# Patient Record
Sex: FEMALE | Race: BLACK OR AFRICAN AMERICAN | NOT HISPANIC OR LATINO | Employment: UNEMPLOYED | URBAN - METROPOLITAN AREA
[De-identification: names, ages, dates, MRNs, and addresses within clinical notes are randomized per-mention and may not be internally consistent; named-entity substitution may affect disease eponyms.]

---

## 2019-02-13 ENCOUNTER — TELEPHONE (OUTPATIENT)
Dept: SCHEDULING | Facility: IMAGING CENTER | Age: 39
End: 2019-02-13

## 2019-04-04 ENCOUNTER — OFFICE VISIT (OUTPATIENT)
Dept: MEDICAL GROUP | Facility: MEDICAL CENTER | Age: 39
End: 2019-04-04
Payer: COMMERCIAL

## 2019-04-04 VITALS
WEIGHT: 170.19 LBS | HEART RATE: 85 BPM | DIASTOLIC BLOOD PRESSURE: 74 MMHG | TEMPERATURE: 98.6 F | OXYGEN SATURATION: 97 % | HEIGHT: 66 IN | BODY MASS INDEX: 27.35 KG/M2 | SYSTOLIC BLOOD PRESSURE: 102 MMHG

## 2019-04-04 DIAGNOSIS — Z00.00 HEALTH CARE MAINTENANCE: ICD-10-CM

## 2019-04-04 DIAGNOSIS — Z76.89 ENCOUNTER TO ESTABLISH CARE: ICD-10-CM

## 2019-04-04 DIAGNOSIS — Z00.00 ANNUAL PHYSICAL EXAM: ICD-10-CM

## 2019-04-04 DIAGNOSIS — Z97.5 IUD (INTRAUTERINE DEVICE) IN PLACE: ICD-10-CM

## 2019-04-04 DIAGNOSIS — Z72.0 TOBACCO USE: ICD-10-CM

## 2019-04-04 PROCEDURE — 99395 PREV VISIT EST AGE 18-39: CPT | Performed by: INTERNAL MEDICINE

## 2019-04-04 ASSESSMENT — PATIENT HEALTH QUESTIONNAIRE - PHQ9: CLINICAL INTERPRETATION OF PHQ2 SCORE: 0

## 2019-04-04 NOTE — PROGRESS NOTES
CHIEF COMPLIANT:  Marilin Lopez is a 38 y.o. female who presents for annual exam    Pt has GYN provider: no    Recommendations:  Regular exercise at least 4 days a week  Diet: advised balanced diet  Dental exam at least 1-2 times per year  Sunscreen use: advised    Immunizations:  TdaP: Pending records  Influenza: advised    GYN  Last PAP:  normal   Abnormal PAP: no    Menarche:      Menses every month with bleeding for 7 days  No excess cramping or bleeding.   Takes OTC analgesics for cramping  Contraception: Mirena, placed 3 yrs ago    CURRENT MEDICATIONS  No current outpatient prescriptions on file.     No current facility-administered medications for this visit.      ALLERGIES  Allergies: Patient has no known allergies.  PAST MEDICAL HISTORY  She  has a past medical history of No known problems.  SURGICAL HISTORY  She  has a past surgical history that includes primary c section.  SOCIAL HISTORY  Social History   Substance Use Topics   • Smoking status: Current Every Day Smoker     Packs/day: 0.25   • Smokeless tobacco: Never Used   • Alcohol use Yes     Social History     Social History Narrative   • No narrative on file     FAMILY HISTORY  Family History   Problem Relation Age of Onset   • Hypertension Mother    • Diabetes Mother    • Hyperlipidemia Mother    • Hypertension Brother      Family Status   Relation Status   • Mo Alive   • Fa Alive   • Bro Alive     REVIEW OF SYSTEMS  Constitutional: Denies fever, chills, sweats, fatigue.  Skin: negative for rash, scaling, itching, pigmentation, hair or nail changes.  Eyes: negative for blurred or double vision, eye pain, floaters and discharge from eyes.  ENT: negative for tinnitus, vertigo, dental problem and hoarseness, frequent URI's, sinus problems/pain.  Respiratory: negative for persistent cough, hemoptysis, dyspnea, wheezing, SOB.  Cardiovascular: negative for palpitations, tachycardia, irregular heart beats, chest pain, or peripheral edema.   Gastrointestinal:  "negative for anorexia, dysphagia, nausea, heartburn/reflux, abdominal pain, hemorrhoids, constipation or diarrhea.  Genitourinary: negative for dysuria, frequency, hesitancy, incontinence, abnormal vaginal discharge/bleeding.  Musculoskeletal: negative for back/joint pain, myalgia.   Neuro: negative for migraine headaches, involuntary movements or tremor  Psychiatric: negative for excessive alcohol use, illegal drug use, sleep problems, anxiety, depression.  Hematologic/Lymphatic/Immunologic: negative for anemia, unusual bruising, swollen glands.  Endocrine: negative for temperature intolerance, polydipsia, polyuria.     PHYSICAL EXAMINATION:  /74 (BP Location: Left arm, Patient Position: Sitting, BP Cuff Size: Adult)   Pulse 85   Temp 37 °C (98.6 °F) (Temporal)   Ht 1.676 m (5' 6\")   Wt 77.2 kg (170 lb 3.1 oz)   SpO2 97%   Body mass index is 27.47 kg/m².  Wt Readings from Last 4 Encounters:   04/04/19 77.2 kg (170 lb 3.1 oz)     General appearance:healthy, well developed, well nourished, well-groomed  Psych: alert, no distress, cooperative. Eye contact good, speech goal directed. Affect calm.   Eyes: conjunctivae and sclerae normal, lids and lashes normal, EOMI, PERRLA  ENT: Ears: external ears normal to inspection, canals clear. TMs pearly gray with normal light reflex and anatomic landmarks, Nose/Sinuses: nose shows no deformity, asymmetry, or inflammation, nasal mucosa normal, no sinus tenderness,   Oropharynx: lips normal without lesions, buccal mucosa normal, gums healthy, teeth intact, non-carious, palate normal, tongue midline and normal  Neck: no asymmetry, masses, adenopathy. Thyroid normal to palpation, carotids without bruits, no jugular venous distention  Lungs: clear to auscultation with good excursion, Normal respiratory rate. Chest symmetric no chest wall tenderness.  Cardiovascular: Regular rate and rhythm, no murmur.  No peripheral edema  Abdomen:  Soft, non-tender, no masses, HSM or " palpable renal abnormalities. Bowel sounds normal, no bruits heard. No CVAT.  Musculoskeletal: no evidence of joint effusion, ROM of all joints is normal, no crepitation detected, strength grossly normal UE and LE, proximal and distal motor groups. No deformities present  Lymphatic: None significantly enlarged supraclavicular, axillary, or inguinal  Skin: color normal, vascularity normal, no rashes or suspicious lesions, no evidence of bleeding or bruising  Neuro: speech normal, mental status intact, gait grossly normal, muscle tone normal.    LABS    None    ASSESSMENT/PLAN:    1. Annual physical exam  Reviewed PMH, PSH, FH, SH, ALL, MEDS, HCM/IMM.   Advised healthy habits, diet, exercise.    2. Health care maintenance  3. Encounter to establish care  Reviewed PMH, PSH, FH, SH, ALL, MEDS, HCM/IMM.   Release form for old records: signed    4. IUD (intrauterine device) in place x 3 yrs  No complications.    5.  Tobacco use  Advised to quit smoking    Smoking Counseling: As above    Next office visit is due in 3 months PAP    All questions are answered.     Please note that this dictation was created using voice recognition software. Despite all efforts, some minor grammar mistakes are possible.

## 2019-06-04 ENCOUNTER — OFFICE VISIT (OUTPATIENT)
Dept: MEDICAL GROUP | Facility: MEDICAL CENTER | Age: 39
End: 2019-06-04
Payer: COMMERCIAL

## 2019-06-04 ENCOUNTER — HOSPITAL ENCOUNTER (OUTPATIENT)
Facility: MEDICAL CENTER | Age: 39
End: 2019-06-04
Attending: INTERNAL MEDICINE
Payer: COMMERCIAL

## 2019-06-04 VITALS
HEART RATE: 78 BPM | HEIGHT: 66 IN | DIASTOLIC BLOOD PRESSURE: 74 MMHG | WEIGHT: 169.09 LBS | SYSTOLIC BLOOD PRESSURE: 116 MMHG | BODY MASS INDEX: 27.18 KG/M2

## 2019-06-04 DIAGNOSIS — R06.83 SNORING: ICD-10-CM

## 2019-06-04 DIAGNOSIS — Z00.00 HEALTH CARE MAINTENANCE: ICD-10-CM

## 2019-06-04 DIAGNOSIS — Z12.4 SCREENING FOR MALIGNANT NEOPLASM OF CERVIX: ICD-10-CM

## 2019-06-04 DIAGNOSIS — Z01.419 WELL FEMALE EXAM WITH ROUTINE GYNECOLOGICAL EXAM: ICD-10-CM

## 2019-06-04 DIAGNOSIS — Z91.09 ENVIRONMENTAL ALLERGIES: ICD-10-CM

## 2019-06-04 DIAGNOSIS — Z72.0 TOBACCO USE: ICD-10-CM

## 2019-06-04 DIAGNOSIS — Z97.5 IUD (INTRAUTERINE DEVICE) IN PLACE: ICD-10-CM

## 2019-06-04 PROCEDURE — 99214 OFFICE O/P EST MOD 30 MIN: CPT | Mod: 25 | Performed by: INTERNAL MEDICINE

## 2019-06-04 PROCEDURE — 99395 PREV VISIT EST AGE 18-39: CPT | Performed by: INTERNAL MEDICINE

## 2019-06-04 PROCEDURE — 87624 HPV HI-RISK TYP POOLED RSLT: CPT

## 2019-06-04 PROCEDURE — 88175 CYTOPATH C/V AUTO FLUID REDO: CPT

## 2019-06-04 PROCEDURE — 99000 SPECIMEN HANDLING OFFICE-LAB: CPT | Performed by: INTERNAL MEDICINE

## 2019-06-04 NOTE — PROGRESS NOTES
SUBJECTIVE: 38 y.o. female for annual routine gynecologic exam  Chief Complaint   Patient presents with   • Shoulder Pain     (L) shoulder pain x 2 weeks   • Knee Pain     (L) knee Pain   • Abdominal Pain     Abdominal Pain/Diarrhea after eating   • Gynecologic Exam     Allergies  New problem to discuss.  The patient has history of allergies, used OTC antihistamine.  She requested allergy referral.    Snoring  New problem.  The patient has difficulty to go to study.  Complains of snoring, daytime sleepiness and fatigue.  She requested referral.    Obstetric History     No data available      Last Pap: remote  H/O Abnormal Pap no  Contraception: IUD placed 2 to 3 years ago, not sure.    History   Sexual Activity   • Sexual activity: Not on file     She  reports that she has been smoking.  She has been smoking about 0.25 packs per day. She has never used smokeless tobacco.     Periods: 7 days, heavy    Allergies: Patient has no known allergies.     ROS:    Menses every month for 7 days heavy bleeding.  Cramping is mild.   She does take OTC analgesics for cramping  No significant bloating/fluid retention, pelvic pain, or dyspareunia. No vaginal discharge   No breast tenderness, mass, nipple discharge, changes in size or contour, or abnormal cyclic discomfort.  No urinary tract symptoms, no incontinence.   No abdominal pain, change in bowel habits, black or bloody stools.    No unusual headaches, no visual changes, menstrual migraines   No prolonged cough. No dyspnea or chest pain on exertion.  No depression, labile mood, anxiety ,libido changes, insomnia.  No new/concerning skin lesions, concerns.    Preventive Care:  Health Maintenance Topics with due status: Overdue       Topic Date Due    IMM DTaP/Tdap/Td Vaccine 12/21/1998    IMM PNEUMOCOCCAL 19-64 (ADULT) MEDIUM RISK SERIES 12/21/1998     Current medicines (including changes today)  No current outpatient prescriptions on file.     No current facility-administered  medications for this visit.      She  has a past medical history of Lactose intolerance.  She  has a past surgical history that includes primary c section.     Family History:   Family History   Problem Relation Age of Onset   • Hypertension Mother    • Diabetes Mother    • Hyperlipidemia Mother    • Hypertension Brother        Family History negative for : Breast, Colon, Lung, or female organ cancer, thyroid disease, CAD, Diabetes, osteoporosis.     OBJECTIVE:   /74 (BP Location: Left arm, Patient Position: Sitting, BP Cuff Size: Adult)   Wt 76.7 kg (169 lb 1.5 oz)   BMI 27.29 kg/m²   Body mass index is 27.29 kg/m².    HEAD AND NECK:  Ears normal.  Throat, oral cavity and tongue normal.  Neck supple. No adenopathy or masses in the neck or supraclavicular regions.  No carotid bruits. No thyromegaly.   NEURO: Cranial nerves are normal. DTR's normal and symmetric.  CHEST:  Clear, good air entry, no wheezes or rales.   HEART:  Regular rate and rhythm.  S1 and S2 normal.  No edema or JVD. ABDOMEN:  Soft without tenderness, guarding, mass or organomegaly.  No CVA tenderness or inguinal adenopathy.   EXTREMITIES:  Extremities, reflexes and peripheral pulses are normal. SKIN: color normal, vascularity normal, no edema, temperature normal   No rashes or suspicious skin lesions noted.     Pelvic Exam -  Normal external genitalia with no lesions. Normal vaginal mucosa with normal rugation and no discharge. Cervix with no visible lesions. No cervical motion tenderness. Uterus is normal sized with no masses. No adnexal tenderness or enlargement appreciated.   Thin Prep PAP is obtained.    <ASSESSMENT and PLAN>    1. Well female exam with routine gynecological exam  - THINPREP PAP WITH HPV; Future    2. Screening for malignant neoplasm of cervix  - THINPREP PAP WITH HPV; Future    3. Health care maintenance  - THINPREP PAP WITH HPV; Future    4. IUD (intrauterine device) in place  She has Mirena, improved  duration/severity of menstrual periods    5. Environmental allergies  Requested referral  - REFERRAL TO ALLERGY    6. Snoring  Requested referral  - REFERRAL TO SLEEP STUDIES  - REFERRAL TO PULMONOLOGY    7. Tobacco use  Advised to quit smoking    Follow-up in 1 year and prn

## 2019-06-05 DIAGNOSIS — Z01.419 WELL FEMALE EXAM WITH ROUTINE GYNECOLOGICAL EXAM: ICD-10-CM

## 2019-06-05 DIAGNOSIS — Z00.00 HEALTH CARE MAINTENANCE: ICD-10-CM

## 2019-06-05 DIAGNOSIS — Z12.4 SCREENING FOR MALIGNANT NEOPLASM OF CERVIX: ICD-10-CM

## 2019-06-06 LAB
CYTOLOGY REG CYTOL: NORMAL
HPV HR 12 DNA CVX QL NAA+PROBE: NEGATIVE
HPV16 DNA SPEC QL NAA+PROBE: NEGATIVE
HPV18 DNA SPEC QL NAA+PROBE: NEGATIVE
SPECIMEN SOURCE: NORMAL

## 2019-07-30 DIAGNOSIS — Z12.83 SKIN CANCER SCREENING: ICD-10-CM

## 2019-09-03 ENCOUNTER — OFFICE VISIT (OUTPATIENT)
Dept: URGENT CARE | Facility: CLINIC | Age: 39
End: 2019-09-03
Payer: COMMERCIAL

## 2019-09-03 VITALS
WEIGHT: 171 LBS | RESPIRATION RATE: 16 BRPM | OXYGEN SATURATION: 98 % | TEMPERATURE: 98.1 F | HEART RATE: 78 BPM | DIASTOLIC BLOOD PRESSURE: 70 MMHG | BODY MASS INDEX: 27.6 KG/M2 | SYSTOLIC BLOOD PRESSURE: 102 MMHG

## 2019-09-03 DIAGNOSIS — R10.30 LOWER ABDOMINAL PAIN: ICD-10-CM

## 2019-09-03 DIAGNOSIS — R11.0 NAUSEA: ICD-10-CM

## 2019-09-03 LAB
APPEARANCE UR: CLEAR
BILIRUB UR STRIP-MCNC: NORMAL MG/DL
COLOR UR AUTO: YELLOW
GLUCOSE UR STRIP.AUTO-MCNC: NORMAL MG/DL
INT CON NEG: NORMAL
INT CON POS: NORMAL
KETONES UR STRIP.AUTO-MCNC: NORMAL MG/DL
LEUKOCYTE ESTERASE UR QL STRIP.AUTO: NORMAL
NITRITE UR QL STRIP.AUTO: NORMAL
PH UR STRIP.AUTO: 6 [PH] (ref 5–8)
POC URINE PREGNANCY TEST: NEGATIVE
PROT UR QL STRIP: NORMAL MG/DL
RBC UR QL AUTO: NORMAL
SP GR UR STRIP.AUTO: 1.02
UROBILINOGEN UR STRIP-MCNC: 0.2 MG/DL

## 2019-09-03 PROCEDURE — 81002 URINALYSIS NONAUTO W/O SCOPE: CPT | Performed by: PHYSICIAN ASSISTANT

## 2019-09-03 PROCEDURE — 99214 OFFICE O/P EST MOD 30 MIN: CPT | Performed by: PHYSICIAN ASSISTANT

## 2019-09-03 PROCEDURE — 81025 URINE PREGNANCY TEST: CPT | Performed by: PHYSICIAN ASSISTANT

## 2019-09-03 RX ORDER — ONDANSETRON 4 MG/1
4 TABLET, FILM COATED ORAL EVERY 4 HOURS PRN
Qty: 20 TAB | Refills: 0 | Status: SHIPPED | OUTPATIENT
Start: 2019-09-03 | End: 2020-05-12

## 2019-09-03 RX ORDER — ONDANSETRON 2 MG/ML
4 INJECTION INTRAMUSCULAR; INTRAVENOUS ONCE
Status: COMPLETED | OUTPATIENT
Start: 2019-09-03 | End: 2019-09-03

## 2019-09-03 RX ADMIN — ONDANSETRON 4 MG: 2 INJECTION INTRAMUSCULAR; INTRAVENOUS at 17:12

## 2019-09-03 ASSESSMENT — ENCOUNTER SYMPTOMS
FLANK PAIN: 0
FEVER: 0
CONSTIPATION: 0
CHANGE IN BOWEL HABIT: 0
CHILLS: 0
BLOOD IN STOOL: 0
ANOREXIA: 1
DIZZINESS: 0
DIARRHEA: 0
COUGH: 0
PALPITATIONS: 0
SORE THROAT: 0
EYE PAIN: 0
ABDOMINAL PAIN: 1
BLURRED VISION: 0
VOMITING: 0
MYALGIAS: 0
HEADACHES: 1
NAUSEA: 1

## 2019-09-03 NOTE — PROGRESS NOTES
Subjective:      Marilin Lopez is a 38 y.o. female who presents with Nausea (cough, weakness x1 day )      Nausea   This is a new problem. The current episode started yesterday (Started last night). The problem occurs constantly. The problem has been unchanged. Associated symptoms include abdominal pain, anorexia, headaches and nausea. Pertinent negatives include no change in bowel habit, chest pain, chills, congestion, coughing, fever, myalgias, rash, sore throat, urinary symptoms or vomiting. Nothing aggravates the symptoms. She has tried nothing for the symptoms.       Review of Systems   Constitutional: Positive for malaise/fatigue. Negative for chills and fever.   HENT: Negative for congestion and sore throat.    Eyes: Negative for blurred vision and pain.   Respiratory: Negative for cough.    Cardiovascular: Negative for chest pain and palpitations.   Gastrointestinal: Positive for abdominal pain, anorexia and nausea. Negative for blood in stool, change in bowel habit, constipation, diarrhea, melena and vomiting.   Genitourinary: Negative for dysuria, flank pain, frequency, hematuria and urgency.   Musculoskeletal: Negative for myalgias.   Skin: Negative for rash.   Neurological: Positive for headaches. Negative for dizziness.       PMH:  has a past medical history of Lactose intolerance.  MEDS:   Current Outpatient Medications:   •  ondansetron (ZOFRAN) 4 MG Tab tablet, Take 1 Tab by mouth every four hours as needed for Nausea/Vomiting., Disp: 20 Tab, Rfl: 0    Current Facility-Administered Medications:   •  ondansetron (ZOFRAN) syringe/vial injection 4 mg, 4 mg, Intramuscular, Once, Renetta Guzman, P.A.-C.  ALLERGIES: No Known Allergies  SURGHX:   Past Surgical History:   Procedure Laterality Date   • PRIMARY C SECTION       SOCHX:  reports that she has been smoking. She has been smoking about 0.25 packs per day. She has never used smokeless tobacco. She reports that she drinks alcohol. She reports that she  does not use drugs.  FH: Family history was reviewed, no pertinent findings to report     Objective:     /70 (BP Location: Left arm, Patient Position: Sitting, BP Cuff Size: Adult)   Pulse 78   Temp 36.7 °C (98.1 °F) (Temporal)   Resp 16   Wt 77.6 kg (171 lb)   LMP 08/21/2019   SpO2 98%   BMI 27.60 kg/m²      Physical Exam   Constitutional: She is oriented to person, place, and time. She appears well-developed and well-nourished.   HENT:   Head: Normocephalic and atraumatic.   Right Ear: External ear normal.   Left Ear: External ear normal.   Eyes: Pupils are equal, round, and reactive to light. Conjunctivae are normal.   Neck: Normal range of motion.   Cardiovascular: Normal rate, regular rhythm and normal heart sounds.   No murmur heard.  Pulmonary/Chest: Effort normal and breath sounds normal. She has no wheezes.   Lymphadenopathy:     She has no cervical adenopathy.   Neurological: She is alert and oriented to person, place, and time.   Skin: Skin is warm and dry. Capillary refill takes less than 2 seconds.   Psychiatric: She has a normal mood and affect. Her behavior is normal. Judgment normal.       POCT Urinalysis  Lab Results   Component Value Date/Time    POCCOLOR YELLOW 09/03/2019 02:05 PM    POCAPPEAR CLEAR 09/03/2019 02:05 PM    POCLEUKEST neg 09/03/2019 02:05 PM    POCNITRITE neg 09/03/2019 02:05 PM    POCUROBILIGE 0.2 09/03/2019 02:05 PM    POCPROTEIN neg 09/03/2019 02:05 PM    POCURPH 6.0 09/03/2019 02:05 PM    POCBLOOD neg 09/03/2019 02:05 PM    POCSPGRV 1.025 09/03/2019 02:05 PM    POCKETONES TR 09/03/2019 02:05 PM    POCBILIRUBIN neg 09/03/2019 02:05 PM    POCGLUCUA neg 09/03/2019 02:05 PM          POCT Pregnancy - Negative     Assessment/Plan:     1. Nausea  - POCT Urinalysis  - POCT Pregnancy  - ondansetron (ZOFRAN) syringe/vial injection 4 mg  - ondansetron (ZOFRAN) 4 MG Tab tablet; Take 1 Tab by mouth every four hours as needed for Nausea/Vomiting.  Dispense: 20 Tab; Refill:  0  *Physical exam unremarkable except for some mild abd TTP. No evidence of UTI. Likely a viral illness or some gastroenteritis. Advised her to increase her fluid intake.    2. Lower abdominal pain  - POCT Urinalysis  - POCT Pregnancy      Differential Diagnosis, natural history, and supportive care discussed. Return to the Urgent Care or follow up with your PCP if symptoms fail to resolve, or for any new or worsening symptoms. Emergency room precautions discussed. Patient and/or family appears understanding of information.

## 2019-09-23 ENCOUNTER — APPOINTMENT (RX ONLY)
Dept: URBAN - METROPOLITAN AREA CLINIC 31 | Facility: CLINIC | Age: 39
Setting detail: DERMATOLOGY
End: 2019-09-23

## 2019-09-23 DIAGNOSIS — L70.0 ACNE VULGARIS: ICD-10-CM

## 2019-09-23 DIAGNOSIS — L20.89 OTHER ATOPIC DERMATITIS: ICD-10-CM

## 2019-09-23 PROCEDURE — 99203 OFFICE O/P NEW LOW 30 MIN: CPT

## 2019-09-23 PROCEDURE — ? COUNSELING

## 2019-09-23 PROCEDURE — ? PRESCRIPTION

## 2019-09-23 PROCEDURE — ? COUNSELING: TOPICAL STEROIDS

## 2019-09-23 RX ORDER — CLINDAMYCIN PHOSPHATE 10 MG/G
GEL TOPICAL
Qty: 60 | Refills: 11 | Status: ERX | COMMUNITY
Start: 2019-09-23

## 2019-09-23 RX ORDER — TRIAMCINOLONE ACETONIDE 1 MG/G
CREAM TOPICAL BID
Qty: 1 | Refills: 2 | Status: ERX | COMMUNITY
Start: 2019-09-23

## 2019-09-23 RX ORDER — DOXYCYCLINE HYCLATE 100 MG/1
CAPSULE, GELATIN COATED ORAL BID
Qty: 60 | Refills: 3 | Status: ERX | COMMUNITY
Start: 2019-09-23

## 2019-09-23 RX ADMIN — DOXYCYCLINE HYCLATE: 100 CAPSULE, GELATIN COATED ORAL at 17:22

## 2019-09-23 RX ADMIN — TRIAMCINOLONE ACETONIDE: 1 CREAM TOPICAL at 17:26

## 2019-09-23 RX ADMIN — CLINDAMYCIN PHOSPHATE: 10 GEL TOPICAL at 17:22

## 2019-09-23 ASSESSMENT — LOCATION SIMPLE DESCRIPTION DERM
LOCATION SIMPLE: LEFT CHEEK
LOCATION SIMPLE: RIGHT UPPER BACK
LOCATION SIMPLE: LEFT FOREARM
LOCATION SIMPLE: LEFT PRETIBIAL REGION
LOCATION SIMPLE: RIGHT POSTERIOR UPPER ARM
LOCATION SIMPLE: RIGHT CHEEK
LOCATION SIMPLE: RIGHT FOREARM
LOCATION SIMPLE: LEFT POSTERIOR UPPER ARM
LOCATION SIMPLE: RIGHT PRETIBIAL REGION

## 2019-09-23 ASSESSMENT — LOCATION DETAILED DESCRIPTION DERM
LOCATION DETAILED: RIGHT PROXIMAL PRETIBIAL REGION
LOCATION DETAILED: LEFT PROXIMAL PRETIBIAL REGION
LOCATION DETAILED: RIGHT SUPERIOR UPPER BACK
LOCATION DETAILED: RIGHT INFERIOR CENTRAL MALAR CHEEK
LOCATION DETAILED: LEFT CENTRAL MANDIBULAR CHEEK
LOCATION DETAILED: RIGHT PROXIMAL DORSAL FOREARM
LOCATION DETAILED: RIGHT LATERAL BUCCAL CHEEK
LOCATION DETAILED: LEFT DISTAL POSTERIOR UPPER ARM
LOCATION DETAILED: LEFT INFERIOR CENTRAL MALAR CHEEK
LOCATION DETAILED: LEFT PROXIMAL DORSAL FOREARM
LOCATION DETAILED: RIGHT PROXIMAL POSTERIOR UPPER ARM

## 2019-09-23 ASSESSMENT — LOCATION ZONE DERM
LOCATION ZONE: ARM
LOCATION ZONE: FACE
LOCATION ZONE: TRUNK
LOCATION ZONE: LEG

## 2019-09-23 NOTE — PROCEDURE: COUNSELING
Detail Level: Zone
Patient Specific Counseling (Will Not Stick From Patient To Patient): Recommended Deb Cream
Tazorac Counseling:  Patient advised that medication is irritating and drying.  Patient may need to apply sparingly and wash off after an hour before eventually leaving it on overnight.  The patient verbalized understanding of the proper use and possible adverse effects of tazorac.  All of the patient's questions and concerns were addressed.
Isotretinoin Counseling: Patient should get monthly blood tests, not donate blood, not drive at night if vision affected, not share medication, and not undergo elective surgery for 6 months after tx completed. Side effects reviewed, pt to contact office should one occur.
Topical Sulfur Applications Pregnancy And Lactation Text: This medication is Pregnancy Category C and has an unknown safety profile during pregnancy. It is unknown if this topical medication is excreted in breast milk.
High Dose Vitamin A Counseling: Side effects reviewed, pt to contact office should one occur.
Topical Clindamycin Counseling: Patient counseled that this medication may cause skin irritation or allergic reactions.  In the event of skin irritation, the patient was advised to reduce the amount of the drug applied or use it less frequently.   The patient verbalized understanding of the proper use and possible adverse effects of clindamycin.  All of the patient's questions and concerns were addressed.
Spironolactone Pregnancy And Lactation Text: This medication can cause feminization of the male fetus and should be avoided during pregnancy. The active metabolite is also found in breast milk.
Dapsone Pregnancy And Lactation Text: This medication is Pregnancy Category C and is not considered safe during pregnancy or breast feeding.
Tetracycline Pregnancy And Lactation Text: This medication is Pregnancy Category D and not consider safe during pregnancy. It is also excreted in breast milk.
Doxycycline Pregnancy And Lactation Text: This medication is Pregnancy Category D and not consider safe during pregnancy. It is also excreted in breast milk but is considered safe for shorter treatment courses.
Erythromycin Pregnancy And Lactation Text: This medication is Pregnancy Category B and is considered safe during pregnancy. It is also excreted in breast milk.
Topical Retinoid Pregnancy And Lactation Text: This medication is Pregnancy Category C. It is unknown if this medication is excreted in breast milk.
Minocycline Counseling: Patient advised regarding possible photosensitivity and discoloration of the teeth, skin, lips, tongue and gums.  Patient instructed to avoid sunlight, if possible.  When exposed to sunlight, patients should wear protective clothing, sunglasses, and sunscreen.  The patient was instructed to call the office immediately if the following severe adverse effects occur:  hearing changes, easy bruising/bleeding, severe headache, or vision changes.  The patient verbalized understanding of the proper use and possible adverse effects of minocycline.  All of the patient's questions and concerns were addressed.
Birth Control Pills Counseling: Birth Control Pill Counseling: I discussed with the patient the potential side effects of OCPs including but not limited to increased risk of stroke, heart attack, thrombophlebitis, deep venous thrombosis, hepatic adenomas, breast changes, GI upset, headaches, and depression.  The patient verbalized understanding of the proper use and possible adverse effects of OCPs. All of the patient's questions and concerns were addressed.
Azithromycin Pregnancy And Lactation Text: This medication is considered safe during pregnancy and is also secreted in breast milk.
Topical Sulfur Applications Counseling: Topical Sulfur Counseling: Patient counseled that this medication may cause skin irritation or allergic reactions.  In the event of skin irritation, the patient was advised to reduce the amount of the drug applied or use it less frequently.   The patient verbalized understanding of the proper use and possible adverse effects of topical sulfur application.  All of the patient's questions and concerns were addressed.
Spironolactone Counseling: Patient advised regarding risks of diarrhea, abdominal pain, hyperkalemia, birth defects (for female patients), liver toxicity and renal toxicity. The patient may need blood work to monitor liver and kidney function and potassium levels while on therapy. The patient verbalized understanding of the proper use and possible adverse effects of spironolactone.  All of the patient's questions and concerns were addressed.
Dapsone Counseling: I discussed with the patient the risks of dapsone including but not limited to hemolytic anemia, agranulocytosis, rashes, methemoglobinemia, kidney failure, peripheral neuropathy, headaches, GI upset, and liver toxicity.  Patients who start dapsone require monitoring including baseline LFTs and weekly CBCs for the first month, then every month thereafter.  The patient verbalized understanding of the proper use and possible adverse effects of dapsone.  All of the patient's questions and concerns were addressed.
Benzoyl Peroxide Pregnancy And Lactation Text: This medication is Pregnancy Category C. It is unknown if benzoyl peroxide is excreted in breast milk.
Doxycycline Counseling:  Patient counseled regarding possible photosensitivity and increased risk for sunburn.  Patient instructed to avoid sunlight, if possible.  When exposed to sunlight, patients should wear protective clothing, sunglasses, and sunscreen.  The patient was instructed to call the office immediately if the following severe adverse effects occur:  hearing changes, easy bruising/bleeding, severe headache, or vision changes.  The patient verbalized understanding of the proper use and possible adverse effects of doxycycline.  All of the patient's questions and concerns were addressed.
Isotretinoin Pregnancy And Lactation Text: This medication is Pregnancy Category X and is considered extremely dangerous during pregnancy. It is unknown if it is excreted in breast milk.
Tazorac Pregnancy And Lactation Text: This medication is not safe during pregnancy. It is unknown if this medication is excreted in breast milk.
Topical Clindamycin Pregnancy And Lactation Text: This medication is Pregnancy Category B and is considered safe during pregnancy. It is unknown if it is excreted in breast milk.
Azithromycin Counseling:  I discussed with the patient the risks of azithromycin including but not limited to GI upset, allergic reaction, drug rash, diarrhea, and yeast infections.
High Dose Vitamin A Pregnancy And Lactation Text: High dose vitamin A therapy is contraindicated during pregnancy and breast feeding.
Bactrim Pregnancy And Lactation Text: This medication is Pregnancy Category D and is known to cause fetal risk.  It is also excreted in breast milk.
Tetracycline Counseling: Patient counseled regarding possible photosensitivity and increased risk for sunburn.  Patient instructed to avoid sunlight, if possible.  When exposed to sunlight, patients should wear protective clothing, sunglasses, and sunscreen.  The patient was instructed to call the office immediately if the following severe adverse effects occur:  hearing changes, easy bruising/bleeding, severe headache, or vision changes.  The patient verbalized understanding of the proper use and possible adverse effects of tetracycline.  All of the patient's questions and concerns were addressed. Patient understands to avoid pregnancy while on therapy due to potential birth defects.
Bactrim Counseling:  I discussed with the patient the risks of sulfa antibiotics including but not limited to GI upset, allergic reaction, drug rash, diarrhea, dizziness, photosensitivity, and yeast infections.  Rarely, more serious reactions can occur including but not limited to aplastic anemia, agranulocytosis, methemoglobinemia, blood dyscrasias, liver or kidney failure, lung infiltrates or desquamative/blistering drug rashes.
Birth Control Pills Pregnancy And Lactation Text: This medication should be avoided if pregnant and for the first 30 days post-partum.
Topical Retinoid counseling:  Patient advised to apply a pea-sized amount only at bedtime and wait 30 minutes after washing their face before applying.  If too drying, patient may add a non-comedogenic moisturizer. The patient verbalized understanding of the proper use and possible adverse effects of retinoids.  All of the patient's questions and concerns were addressed.
Benzoyl Peroxide Counseling: Patient counseled that medicine may cause skin irritation and bleach clothing.  In the event of skin irritation, the patient was advised to reduce the amount of the drug applied or use it less frequently.   The patient verbalized understanding of the proper use and possible adverse effects of benzoyl peroxide.  All of the patient's questions and concerns were addressed.
Erythromycin Counseling:  I discussed with the patient the risks of erythromycin including but not limited to GI upset, allergic reaction, drug rash, diarrhea, increase in liver enzymes, and yeast infections.

## 2019-10-03 ENCOUNTER — SLEEP CENTER VISIT (OUTPATIENT)
Dept: SLEEP MEDICINE | Facility: MEDICAL CENTER | Age: 39
End: 2019-10-03
Payer: COMMERCIAL

## 2019-10-03 VITALS
RESPIRATION RATE: 14 BRPM | HEART RATE: 89 BPM | OXYGEN SATURATION: 97 % | SYSTOLIC BLOOD PRESSURE: 112 MMHG | HEIGHT: 66 IN | WEIGHT: 177 LBS | DIASTOLIC BLOOD PRESSURE: 60 MMHG | BODY MASS INDEX: 28.45 KG/M2

## 2019-10-03 DIAGNOSIS — Z72.0 TOBACCO USE: ICD-10-CM

## 2019-10-03 DIAGNOSIS — G47.33 OSA (OBSTRUCTIVE SLEEP APNEA): ICD-10-CM

## 2019-10-03 DIAGNOSIS — E66.3 OVERWEIGHT: ICD-10-CM

## 2019-10-03 DIAGNOSIS — Z23 NEED FOR INFLUENZA VACCINATION: ICD-10-CM

## 2019-10-03 PROCEDURE — 99244 OFF/OP CNSLTJ NEW/EST MOD 40: CPT | Performed by: INTERNAL MEDICINE

## 2019-10-03 RX ORDER — DOXYCYCLINE HYCLATE 100 MG/1
CAPSULE ORAL
Refills: 3 | COMMUNITY
Start: 2019-09-23 | End: 2019-10-02

## 2019-10-03 RX ORDER — TRIAMCINOLONE ACETONIDE 1 MG/G
CREAM TOPICAL
Refills: 2 | COMMUNITY
Start: 2019-09-23 | End: 2019-10-01

## 2019-10-03 RX ORDER — CETIRIZINE HYDROCHLORIDE 10 MG/1
10 TABLET ORAL DAILY
COMMUNITY

## 2019-10-03 RX ORDER — ALBUTEROL SULFATE 90 UG/1
2 AEROSOL, METERED RESPIRATORY (INHALATION)
Refills: 5 | COMMUNITY
Start: 2019-08-15

## 2019-10-03 RX ORDER — ZOLPIDEM TARTRATE 5 MG/1
5 TABLET ORAL NIGHTLY PRN
Qty: 3 TAB | Refills: 0 | Status: SHIPPED | OUTPATIENT
Start: 2019-10-03 | End: 2019-11-03

## 2019-10-03 RX ORDER — CLINDAMYCIN PHOSPHATE 10 MG/G
GEL TOPICAL
Refills: 11 | COMMUNITY
Start: 2019-09-23 | End: 2019-10-01

## 2019-10-03 NOTE — PROGRESS NOTES
"CC: \"Stops breathing while sleeping\"    HPI:  Ms. Marilin Lopez is a 38-year-old female kindly referred by Dr. Epps for evaluation of possible obstructive sleep apnea syndrome.  Dr. Epps was able to elicit a history of snoring, daytime sleepiness, and fatigue which prompted the referral.    Patient briefly describes her sleep problem as \"loud snoring and sometimes coughing.  Stops breathing.  3 to 4 years duration\".  She considers the severity to be about a 3 out of 10.    She goes to bed between 1-3 AM and gets up between 6- 8 AM.  Her sleep latency is partially 1 hour.  She may shower, watch TV, or listen to his \"sleeping music\" just prior to turn out the lights and attempting to go to sleep.  On the average she wakes up 34 times each night.  She may experience one episode of nocturia.    Symptoms include waking up too early in the morning and being unable to return to sleep, sleepiness during the day, too little sleep at night, and tiredness during the day.  She estimates she only sleeps about 5 to 6 hours.    She does experience nocturnal shortness of breath and is known to be a loud enough snorer to disturb her bed partner.    She reports restless legs and leg or arm jerking or twitching during sleep.  She reports sleep talking, waking up screaming or seemingly afraid, disturbing dreams, and wakes up with headaches.  Her grandmother and son both have sleep onset insomnia.  She may fall asleep accidentally when watching TV.        Significant comorbidities and modifying factors include tobacco abuse, allergies, overweight, need for influenza vaccination, and snoring.            Patient Active Problem List    Diagnosis Date Noted   • CECILIO (obstructive sleep apnea) 10/03/2019   • Overweight 10/03/2019   • Need for influenza vaccination 10/03/2019   • IUD (intrauterine device) in place 06/04/2019   • Health care maintenance 04/04/2019   • Annual physical exam 04/04/2019   • Tobacco use 04/04/2019       Past Medical " History:   Diagnosis Date   • Chickenpox    • Influenza    • Lactose intolerance         Past Surgical History:   Procedure Laterality Date   • PRIMARY C SECTION         Family History   Problem Relation Age of Onset   • Hypertension Mother    • Diabetes Mother    • Hyperlipidemia Mother    • Hypertension Brother        Social History     Socioeconomic History   • Marital status: Single     Spouse name: Not on file   • Number of children: Not on file   • Years of education: Not on file   • Highest education level: Not on file   Occupational History   • Not on file   Social Needs   • Financial resource strain: Not on file   • Food insecurity:     Worry: Not on file     Inability: Not on file   • Transportation needs:     Medical: Not on file     Non-medical: Not on file   Tobacco Use   • Smoking status: Current Every Day Smoker     Packs/day: 0.25     Types: Cigars   • Smokeless tobacco: Never Used   • Tobacco comment: 6/day   Substance and Sexual Activity   • Alcohol use: Yes     Alcohol/week: 0.6 oz     Types: 1 Glasses of wine per week   • Drug use: No   • Sexual activity: Not on file   Lifestyle   • Physical activity:     Days per week: Not on file     Minutes per session: Not on file   • Stress: Not on file   Relationships   • Social connections:     Talks on phone: Not on file     Gets together: Not on file     Attends Mormonism service: Not on file     Active member of club or organization: Not on file     Attends meetings of clubs or organizations: Not on file     Relationship status: Not on file   • Intimate partner violence:     Fear of current or ex partner: Not on file     Emotionally abused: Not on file     Physically abused: Not on file     Forced sexual activity: Not on file   Other Topics Concern   • Not on file   Social History Narrative   • Not on file       Current Outpatient Medications   Medication Sig Dispense Refill   • albuterol 108 (90 Base) MCG/ACT Aero Soln inhalation aerosol Inhale 2 Puffs  "by mouth.  5   • cetirizine (ZYRTEC) 10 MG Tab Take 10 mg by mouth every day.     • zolpidem (AMBIEN) 5 MG Tab Take 1 Tab by mouth at bedtime as needed for up to 3 doses. Take 1-3 tabs prn 3 Tab 0   • ondansetron (ZOFRAN) 4 MG Tab tablet Take 1 Tab by mouth every four hours as needed for Nausea/Vomiting. 20 Tab 0     No current facility-administered medications for this visit.     \"CURRENT RX\"    ALLERGIES: Patient has no known allergies.    ROS    Constitutional: Denies fever, chills, sweats,  weight loss, fatigue.  Eyes: Denies vision loss, pain, drainage, double vision, wears glasses.  Ears/Nose/Mouth/Throat: Denies earache, difficulty hearing, rhinitis/nasal congestion, injury, recurrent sore throat, persistent hoarseness, decayed teeth/toothaches, positive for ringing or buzzing in the ears.  Cardiovascular: Denies chest pain, tightness, palpitations, swelling in legs/feet, fainting, difficulty breathing when lying down but gets better when sitting up.   Respiratory: Positive for shortness of breath and cough  Sleep: per HPI  Gastrointestinal: Denies  difficulty swallowing, positive for nausea, denies the following abdominal pain, diarrhea, constipation, heartburn.  Genitourinary: Denies  blood in urine, discharge, frequent urination.  Positive for irregular periods and bleeding between periods  Musculoskeletal: Denies painful joints, sore muscles, back pain.   Integumentary: Denies rashes, lumps, positive for color changes.   Neurological: Positive for frequent headaches and dizziness    PHYSICAL EXAM  Overweight but not obese    /60 (BP Location: Left arm, Patient Position: Sitting, BP Cuff Size: Adult)   Pulse 89   Resp 14   Ht 1.676 m (5' 6\")   Wt 80.3 kg (177 lb)   SpO2 97%   BMI 28.57 kg/m²   Appearance: Well-nourished, well-developed, no acute distress  Eyes:  PERRLA, EOMI  ENMT: without lesions, deformities;hearing grossly intact; tongue normal, posterior pharynx without erythema or exudate; "   Modified Mallampati (AKA Rendon) Score: 2-3  Neck: Supple, trachea midline, no masses  Respiratory effort:  No intercostal retractions or use of accessory muscles  Lung auscultation:  No wheezes rhonchi rubs or rales  Cardiac: No murmurs, rubs, or gallops; regular rhythm, normal rate; no edema  Abdomen:  No tenderness, no organomegaly.  Overweight but not obese  Musculoskeletal:  Grossly normal; gait and station normal; digits and nails normal  Skin:  No rashes, petechiae, cyanosis  Neurologic: without focal signs; oriented to person, time, place, and purpose; judgement intact  Psychiatric:  No depression, anxiety, agitation        PROBLEMS:  1. CECILIO (obstructive sleep apnea)    - Polysomnography, 4 or More; Future  - zolpidem (AMBIEN) 5 MG Tab; Take 1 Tab by mouth at bedtime as needed for up to 3 doses. Take 1-3 tabs prn  Dispense: 3 Tab; Refill: 0    2. Overweight      3. Tobacco use      4. Need for influenza vaccination      PLAN:   The patient has signs and symptoms consistent with obstructive sleep apnea hypopnea syndrome. Will schedule to have a nocturnal polysomnogram using zolpidem to assist with sleep onset and maintenance should the need arise. Will return after the results are available to determine further diagnostic needs and/or treatment options.    The risks of untreated sleep apnea were discussed with the patient at length. Patients with CECILIO are at increased risk of cardiovascular disease including coronary artery disease, systemic arterial hypertension, pulmonary arterial hypertension, cardiac arrythmias, and stroke. CECILIO patients have an increased risk of motor vehicle accidents, type 2 diabetes, chronic kidney disease, and non-alcoholic liver disease. The patient was advised to avoid driving a motor vehicle when drowsy.    Positive airway pressure, such as CPAP, is considered first-line and preferred therapy for sleep apnea and may reverse both symptoms and risks.    Have advised the patient to  follow up with the appropriate healthcare practitioners for all other medical problems and issues.      Return for after sleep study.

## 2019-10-10 ENCOUNTER — TELEPHONE (OUTPATIENT)
Dept: SLEEP MEDICINE | Facility: MEDICAL CENTER | Age: 39
End: 2019-10-10

## 2019-10-10 DIAGNOSIS — G47.33 OBSTRUCTIVE SLEEP APNEA: ICD-10-CM

## 2019-10-10 NOTE — TELEPHONE ENCOUNTER
PATIENT DOESN'T HAVE COMORBID CONDITIONS TO MAKE ATTENDED SLEEP STUDY MEDICALLY NECESSARY.     WOULD PROVIDER LIKE TO SWITCH TO HST?

## 2019-10-24 ENCOUNTER — HOME STUDY (OUTPATIENT)
Dept: SLEEP MEDICINE | Facility: MEDICAL CENTER | Age: 39
End: 2019-10-24
Attending: INTERNAL MEDICINE
Payer: COMMERCIAL

## 2019-10-24 DIAGNOSIS — G47.33 OBSTRUCTIVE SLEEP APNEA: ICD-10-CM

## 2019-10-24 PROCEDURE — 95806 SLEEP STUDY UNATT&RESP EFFT: CPT | Performed by: FAMILY MEDICINE

## 2019-10-28 NOTE — PROCEDURES
DIAGNOSTIC HOME SLEEP TEST (HST) REPORT       PATIENT ID:  NAME:  Marilin Lopez  MRN:               [id number]  YOB: 1980  DATE OF STUDY: 10/26/19      Impression:     This study shows evidence of:     1.Mild obstructive sleep apnea with  Respiratory Event Index (LESLI) of 5.5 per hour and worse in supine sleep with LESLI at 7.3. These findings are based on the recording time (flow evaluation time). It is not possible with this device to determine a traditional apnea+hypopnea index (AHI) for total sleep time since EEG channels are not available.     2.  O2 Sat. tahir was 83%, avg O2 was 97 % and baseline O2 at 100 %. O2 sat was below 89% for 18 min of the flow evaluation time. Avg HR 88 BPM.      TECHNICAL DESCRIPTION:  Raise5 Device used was a type-III home study device. Home sleep study recording included: Airflow recording by nasal pressure transducer; Respiratory Effort by abdominal Respiratory Bands; O2 by finger oximetry. A position sensor and a snore channel was also used.    Scoring Criteria: A modification of the the AASM Manual for the Scoring of Sleep and Associated Events, 2012, was used.   Obstructive apnea was scored by cessation of airflow for at least 10 seconds with continuing respiratory effort.  Central apnea was scored by cessation of airflow for at least 10 seconds with no effort.  Hypopnea was scored by a 30% or more reduction in airflow for at least 10 seconds accompanied by an arterial oxygen desaturation of 3% or more.  (For Medicare patients, hypopneas were scored by a 30% or more reduction in airflow for at least 10 seconds accompanied by an arterial oxygen saturation of 4% or more, as required by their insurance, CMS.        General sleep summary: . Total recording time is 435 minutes and total flow evaluation time is 428 minutes. The patient spent 279 minutes in the supine position.  Respiratory events:    Apneas: 8 (Obstructive apnea index 1/hr, Central apnea index  0.1 /hr, mixed 0 /hour)  Hypopneas: 31    Recommendations:    1. CPAP titration study vs Auto CPAP trial .   2.   In general patients with sleep apnea are advised to avoid alcohol and sedatives and to not operate a motor vehicle while drowsy. In some cases alternative treatment options may prove effective in resolving sleep apnea in these options include upper airway surgery, the use of a dental orthotic or weight loss and positional therapy. Clinical correlation is required.         Laurel Whittington MD

## 2019-10-31 ENCOUNTER — OFFICE VISIT (OUTPATIENT)
Dept: URGENT CARE | Facility: CLINIC | Age: 39
End: 2019-10-31
Payer: COMMERCIAL

## 2019-10-31 VITALS
TEMPERATURE: 98.7 F | DIASTOLIC BLOOD PRESSURE: 64 MMHG | OXYGEN SATURATION: 98 % | WEIGHT: 170 LBS | RESPIRATION RATE: 16 BRPM | HEIGHT: 66 IN | SYSTOLIC BLOOD PRESSURE: 120 MMHG | HEART RATE: 82 BPM | BODY MASS INDEX: 27.32 KG/M2

## 2019-10-31 DIAGNOSIS — R05.9 COUGH: ICD-10-CM

## 2019-10-31 DIAGNOSIS — R09.82 ALLERGIC RHINITIS WITH POSTNASAL DRIP: ICD-10-CM

## 2019-10-31 DIAGNOSIS — J30.9 ALLERGIC RHINITIS WITH POSTNASAL DRIP: ICD-10-CM

## 2019-10-31 PROCEDURE — 99214 OFFICE O/P EST MOD 30 MIN: CPT | Performed by: PHYSICIAN ASSISTANT

## 2019-10-31 RX ORDER — DOXYCYCLINE HYCLATE 100 MG/1
100 CAPSULE ORAL
COMMUNITY
Start: 2019-10-20 | End: 2020-05-12

## 2019-10-31 RX ORDER — BENZONATATE 100 MG/1
100 CAPSULE ORAL 3 TIMES DAILY PRN
Qty: 30 CAP | Refills: 0 | Status: SHIPPED | OUTPATIENT
Start: 2019-10-31 | End: 2020-05-12

## 2019-10-31 RX ORDER — CLINDAMYCIN PHOSPHATE 10 MG/G
1 GEL TOPICAL
COMMUNITY
Start: 2019-10-20 | End: 2020-05-12

## 2019-10-31 ASSESSMENT — ENCOUNTER SYMPTOMS
FEVER: 0
SHORTNESS OF BREATH: 0
WHEEZING: 0
SPUTUM PRODUCTION: 0
CHILLS: 0
COUGH: 1

## 2019-11-01 NOTE — PROGRESS NOTES
Subjective:   Marilin Lopez is a 38 y.o. female who presents today with   Chief Complaint   Patient presents with   • Cough     x2 days   • Congestion   • Chest Pain     Cough   This is a new problem. Episode onset: 2 days. The problem has been unchanged. The problem occurs every few minutes. The cough is non-productive. Associated symptoms include nasal congestion and postnasal drip. Pertinent negatives include no chills, fever, shortness of breath or wheezing. Nothing aggravates the symptoms. Treatments tried: Zyrtec. The treatment provided no relief. Her past medical history is significant for asthma.   Started as a sore throat.   Patient states she typically does experience allergies in the fall.  Her cough is been keeping her up through the night.  PMH:  has a past medical history of Chickenpox, Influenza, and Lactose intolerance.  MEDS:   Current Outpatient Medications:   •  doxycycline (VIBRAMYCIN) 100 MG Cap, Take 100 mg by mouth., Disp: , Rfl:   •  clindamycin (CLEOCIN T) 1 % Gel, Apply 1 Application to affected area(s)., Disp: , Rfl:   •  benzonatate (TESSALON) 100 MG Cap, Take 1 Cap by mouth 3 times a day as needed., Disp: 30 Cap, Rfl: 0  •  albuterol 108 (90 Base) MCG/ACT Aero Soln inhalation aerosol, Inhale 2 Puffs by mouth., Disp: , Rfl: 5  •  cetirizine (ZYRTEC) 10 MG Tab, Take 10 mg by mouth every day., Disp: , Rfl:   •  zolpidem (AMBIEN) 5 MG Tab, Take 1 Tab by mouth at bedtime as needed for up to 3 doses. Take 1-3 tabs prn, Disp: 3 Tab, Rfl: 0  •  ondansetron (ZOFRAN) 4 MG Tab tablet, Take 1 Tab by mouth every four hours as needed for Nausea/Vomiting., Disp: 20 Tab, Rfl: 0  ALLERGIES: No Known Allergies  SURGHX:   Past Surgical History:   Procedure Laterality Date   • PRIMARY C SECTION       SOCHX:  reports that she has been smoking cigars. She has been smoking about 0.25 packs per day. She has never used smokeless tobacco. She reports that she drinks about 0.6 oz of alcohol per week. She reports  "that she does not use drugs.  FH: Reviewed with patient, not pertinent to this visit.     Review of Systems   Constitutional: Negative for chills and fever.   HENT: Positive for postnasal drip.    Respiratory: Positive for cough. Negative for sputum production, shortness of breath and wheezing.    All other systems reviewed and are negative.     Objective:   /64 (BP Location: Left arm, Patient Position: Sitting, BP Cuff Size: Adult)   Pulse 82   Temp 37.1 °C (98.7 °F) (Temporal)   Resp 16   Ht 1.676 m (5' 6\")   Wt 77.1 kg (170 lb)   LMP 10/17/2019 (Exact Date)   SpO2 98%   Breastfeeding? No   BMI 27.44 kg/m²   Physical Exam   Constitutional: Vital signs are normal. She appears well-developed and well-nourished. No distress.   HENT:   Head: Normocephalic and atraumatic.   Right Ear: Hearing normal.   Left Ear: Hearing normal.   Nose: Rhinorrhea present.   Eyes: Pupils are equal, round, and reactive to light.   Cardiovascular: Normal rate, regular rhythm and normal heart sounds.   Pulmonary/Chest: Effort normal and breath sounds normal. No stridor. She has no wheezes. She has no rales.   Musculoskeletal:   Normal movement in all 4 extremities   Neurological: She is alert. Coordination normal.   Skin: Skin is warm and dry.   Psychiatric: She has a normal mood and affect.   Nursing note and vitals reviewed.    Assessment/Plan:   Assessment    1. Allergic rhinitis with postnasal drip    2. Cough  - benzonatate (TESSALON) 100 MG Cap; Take 1 Cap by mouth 3 times a day as needed.  Dispense: 30 Cap; Refill: 0    Other orders  - doxycycline (VIBRAMYCIN) 100 MG Cap; Take 100 mg by mouth.  - clindamycin (CLEOCIN T) 1 % Gel; Apply 1 Application to affected area(s).  Continue OTC allergy meds and try inhaler. Add on Mucinex  Differential diagnosis, natural history, supportive care, and indications for immediate follow-up discussed.   Patient given instructions and understanding of medications and treatment.    If " not improving in 3-5 days, F/U with PCP or return to UC if symptoms worsen.    Patient agreeable to plan.      Please note that this dictation was created using voice recognition software. I have made every reasonable attempt to correct obvious errors, but I expect that there are errors of grammar and possibly content that I did not discover before finalizing the note.    Zion Fuentes PA-C

## 2020-01-07 NOTE — PROGRESS NOTES
CC: PSG results    HPI:  Ms. Marilin Lopez is a 38-year-old female kindly referred by Dr. Epps for evaluation of possible obstructive sleep apnea syndrome.  Dr. Epps was able to elicit a history of snoring, daytime sleepiness, and fatigue which prompted the referral.    Her home study was performed on 10/24/2019 and showed an LESLI of 5.5, a supine LESLI of 7.3, a tahir saturation of 83% and saturations less than 89% for 18 minutes.  A home sleep study may significantly underestimate the severity of sleep disordered breathing.        Significant comorbidities and modifying factors include tobacco abuse, allergies, overweight, need for influenza vaccination, and snoring.    Patient Active Problem List    Diagnosis Date Noted   • CECILIO (obstructive sleep apnea) 10/03/2019   • Overweight 10/03/2019   • Need for influenza vaccination 10/03/2019   • IUD (intrauterine device) in place 06/04/2019   • Health care maintenance 04/04/2019   • Annual physical exam 04/04/2019   • Tobacco use 04/04/2019       Past Medical History:   Diagnosis Date   • Chickenpox    • Influenza    • Lactose intolerance         Past Surgical History:   Procedure Laterality Date   • PRIMARY C SECTION         Family History   Problem Relation Age of Onset   • Hypertension Mother    • Diabetes Mother    • Hyperlipidemia Mother    • Hypertension Brother        Social History     Socioeconomic History   • Marital status: Single     Spouse name: Not on file   • Number of children: Not on file   • Years of education: Not on file   • Highest education level: Not on file   Occupational History   • Not on file   Social Needs   • Financial resource strain: Not on file   • Food insecurity:     Worry: Not on file     Inability: Not on file   • Transportation needs:     Medical: Not on file     Non-medical: Not on file   Tobacco Use   • Smoking status: Current Every Day Smoker     Packs/day: 0.25     Types: Cigars   • Smokeless tobacco: Never Used   • Tobacco comment:  "6/day   Substance and Sexual Activity   • Alcohol use: Yes     Alcohol/week: 0.6 oz     Types: 1 Glasses of wine per week   • Drug use: No   • Sexual activity: Not on file   Lifestyle   • Physical activity:     Days per week: Not on file     Minutes per session: Not on file   • Stress: Not on file   Relationships   • Social connections:     Talks on phone: Not on file     Gets together: Not on file     Attends Jainism service: Not on file     Active member of club or organization: Not on file     Attends meetings of clubs or organizations: Not on file     Relationship status: Not on file   • Intimate partner violence:     Fear of current or ex partner: Not on file     Emotionally abused: Not on file     Physically abused: Not on file     Forced sexual activity: Not on file   Other Topics Concern   • Not on file   Social History Narrative   • Not on file       Current Outpatient Medications   Medication Sig Dispense Refill   • clindamycin (CLEOCIN T) 1 % Gel Apply 1 Application to affected area(s).     • albuterol 108 (90 Base) MCG/ACT Aero Soln inhalation aerosol Inhale 2 Puffs by mouth.  5   • cetirizine (ZYRTEC) 10 MG Tab Take 10 mg by mouth every day.     • ondansetron (ZOFRAN) 4 MG Tab tablet Take 1 Tab by mouth every four hours as needed for Nausea/Vomiting. 20 Tab 0   • doxycycline (VIBRAMYCIN) 100 MG Cap Take 100 mg by mouth.     • benzonatate (TESSALON) 100 MG Cap Take 1 Cap by mouth 3 times a day as needed. (Patient not taking: Reported on 1/9/2020) 30 Cap 0     No current facility-administered medications for this visit.     \"CURRENT RX\"    ALLERGIES: Patient has no known allergies.    ROS  Eyes: Wears glasses  Constitutional: Denies fever, chills, sweats,  weight loss, fatigue  Cardiovascular: Denies chest pain, tightness, palpitations, swelling in legs/feet, fainting, difficulty breathing when lying down but gets better when sitting up.   Respiratory: Positive for shortness of breath and cough  Sleep: " "per HPI  Gastrointestinal: Denies  difficulty swallowing, nausea, abdominal pain, diarrhea, constipation, heartburn.  Musculoskeletal: Denies painful joints, sore muscles, back pain.    : Irregular periods  Neurological: Positive for frequent headaches and dizziness      PHYSICAL EXAM  Her weight    /64 (BP Location: Right arm, Patient Position: Sitting, BP Cuff Size: Adult)   Pulse 88   Resp 14   Ht 1.676 m (5' 6\")   Wt 80.3 kg (177 lb)   SpO2 97%   BMI 28.57 kg/m²   Appearance: Well-nourished, well-developed, no acute distress  Eyes:  PERRLA, EOMI  ENMT: without lesions, deformities;hearing grossly intact; tongue normal, posterior pharynx without erythema or exudate;   Modified Mallampati (AKA Freidman) Score: 2-3  Neck: Supple, trachea midline, no masses  Respiratory effort:  No intercostal retractions or use of accessory muscles  Lung auscultation:  No wheezes rhonchi rubs or rales  Cardiac: No murmurs, rubs, or gallops; regular rhythm, normal rate; no edema  Abdomen:  No tenderness, no organomegaly.  Overweight  Musculoskeletal:  Grossly normal; gait and station normal; digits and nails normal  Skin:  No rashes, petechiae, cyanosis  Neurologic: without focal signs; oriented to person, time, place, and purpose; judgement intact  Psychiatric:  No depression, anxiety, agitation        PROBLEMS:  1. CECILIO (obstructive sleep apnea)    - DME CPAP    2. Overweight      3. Tobacco use -advised    4. Need for influenza vaccination        PLAN:   Her home study was performed on 10/24/2019 and showed an LESLI of 5.5, a supine LESLI of 7.3, a tahir saturation of 83% and saturations less than 89% for 18 minutes.  A home sleep study may significantly underestimate the severity of sleep disordered breathing.    Will prescribe auto titrating CPAP 5 to 15 cm H2O using a mask of choice and heated humidification followed by clinical and compliance review in 31 to 90 days after receiving the equipment.    Return in about " 10 weeks (around 3/19/2020).

## 2020-01-09 ENCOUNTER — SLEEP CENTER VISIT (OUTPATIENT)
Dept: SLEEP MEDICINE | Facility: MEDICAL CENTER | Age: 40
End: 2020-01-09
Payer: COMMERCIAL

## 2020-01-09 VITALS
HEIGHT: 66 IN | RESPIRATION RATE: 14 BRPM | BODY MASS INDEX: 28.45 KG/M2 | WEIGHT: 177 LBS | HEART RATE: 88 BPM | DIASTOLIC BLOOD PRESSURE: 64 MMHG | SYSTOLIC BLOOD PRESSURE: 114 MMHG | OXYGEN SATURATION: 97 %

## 2020-01-09 DIAGNOSIS — E66.3 OVERWEIGHT: ICD-10-CM

## 2020-01-09 DIAGNOSIS — Z72.0 TOBACCO USE: ICD-10-CM

## 2020-01-09 DIAGNOSIS — Z23 NEED FOR INFLUENZA VACCINATION: ICD-10-CM

## 2020-01-09 DIAGNOSIS — G47.33 OSA (OBSTRUCTIVE SLEEP APNEA): ICD-10-CM

## 2020-01-09 PROCEDURE — 99214 OFFICE O/P EST MOD 30 MIN: CPT | Performed by: INTERNAL MEDICINE

## 2020-01-21 ENCOUNTER — OFFICE VISIT (OUTPATIENT)
Dept: MEDICAL GROUP | Facility: MEDICAL CENTER | Age: 40
End: 2020-01-21
Payer: COMMERCIAL

## 2020-01-21 ENCOUNTER — HOSPITAL ENCOUNTER (OUTPATIENT)
Dept: LAB | Facility: MEDICAL CENTER | Age: 40
End: 2020-01-21
Attending: INTERNAL MEDICINE
Payer: COMMERCIAL

## 2020-01-21 VITALS
DIASTOLIC BLOOD PRESSURE: 70 MMHG | WEIGHT: 177 LBS | TEMPERATURE: 99.1 F | SYSTOLIC BLOOD PRESSURE: 110 MMHG | BODY MASS INDEX: 28.45 KG/M2 | OXYGEN SATURATION: 98 % | HEIGHT: 66 IN | HEART RATE: 73 BPM

## 2020-01-21 DIAGNOSIS — K92.0 HEMATEMESIS WITH NAUSEA: ICD-10-CM

## 2020-01-21 DIAGNOSIS — K62.5 BRBPR (BRIGHT RED BLOOD PER RECTUM): ICD-10-CM

## 2020-01-21 DIAGNOSIS — R11.0 NAUSEA: ICD-10-CM

## 2020-01-21 DIAGNOSIS — E55.9 HYPOVITAMINOSIS D: ICD-10-CM

## 2020-01-21 DIAGNOSIS — R53.83 FATIGUE, UNSPECIFIED TYPE: ICD-10-CM

## 2020-01-21 DIAGNOSIS — K92.2 GASTROINTESTINAL HEMORRHAGE, UNSPECIFIED GASTROINTESTINAL HEMORRHAGE TYPE: ICD-10-CM

## 2020-01-21 DIAGNOSIS — Z12.39 SCREENING FOR MALIGNANT NEOPLASM OF BREAST: ICD-10-CM

## 2020-01-21 LAB
25(OH)D3 SERPL-MCNC: 15 NG/ML (ref 30–100)
ALBUMIN SERPL BCP-MCNC: 4.2 G/DL (ref 3.2–4.9)
ALBUMIN/GLOB SERPL: 1.4 G/DL
ALP SERPL-CCNC: 50 U/L (ref 30–99)
ALT SERPL-CCNC: 16 U/L (ref 2–50)
ANION GAP SERPL CALC-SCNC: 8 MMOL/L (ref 0–11.9)
AST SERPL-CCNC: 15 U/L (ref 12–45)
BASOPHILS # BLD AUTO: 0.5 % (ref 0–1.8)
BASOPHILS # BLD: 0.05 K/UL (ref 0–0.12)
BILIRUB SERPL-MCNC: 0.4 MG/DL (ref 0.1–1.5)
BUN SERPL-MCNC: 10 MG/DL (ref 8–22)
CALCIUM SERPL-MCNC: 9.6 MG/DL (ref 8.5–10.5)
CHLORIDE SERPL-SCNC: 105 MMOL/L (ref 96–112)
CO2 SERPL-SCNC: 27 MMOL/L (ref 20–33)
CREAT SERPL-MCNC: 0.9 MG/DL (ref 0.5–1.4)
EOSINOPHIL # BLD AUTO: 0.05 K/UL (ref 0–0.51)
EOSINOPHIL NFR BLD: 0.5 % (ref 0–6.9)
ERYTHROCYTE [DISTWIDTH] IN BLOOD BY AUTOMATED COUNT: 50.7 FL (ref 35.9–50)
FASTING STATUS PATIENT QL REPORTED: NORMAL
GLOBULIN SER CALC-MCNC: 3.1 G/DL (ref 1.9–3.5)
GLUCOSE SERPL-MCNC: 89 MG/DL (ref 65–99)
HCT VFR BLD AUTO: 47.8 % (ref 37–47)
HGB BLD-MCNC: 15.6 G/DL (ref 12–16)
IMM GRANULOCYTES # BLD AUTO: 0.02 K/UL (ref 0–0.11)
IMM GRANULOCYTES NFR BLD AUTO: 0.2 % (ref 0–0.9)
LYMPHOCYTES # BLD AUTO: 2.9 K/UL (ref 1–4.8)
LYMPHOCYTES NFR BLD: 29.6 % (ref 22–41)
MCH RBC QN AUTO: 32.7 PG (ref 27–33)
MCHC RBC AUTO-ENTMCNC: 32.6 G/DL (ref 33.6–35)
MCV RBC AUTO: 100.2 FL (ref 81.4–97.8)
MONOCYTES # BLD AUTO: 0.43 K/UL (ref 0–0.85)
MONOCYTES NFR BLD AUTO: 4.4 % (ref 0–13.4)
NEUTROPHILS # BLD AUTO: 6.36 K/UL (ref 2–7.15)
NEUTROPHILS NFR BLD: 64.8 % (ref 44–72)
NRBC # BLD AUTO: 0 K/UL
NRBC BLD-RTO: 0 /100 WBC
PLATELET # BLD AUTO: 233 K/UL (ref 164–446)
PMV BLD AUTO: 9.6 FL (ref 9–12.9)
POTASSIUM SERPL-SCNC: 4.3 MMOL/L (ref 3.6–5.5)
PROT SERPL-MCNC: 7.3 G/DL (ref 6–8.2)
RBC # BLD AUTO: 4.77 M/UL (ref 4.2–5.4)
SODIUM SERPL-SCNC: 140 MMOL/L (ref 135–145)
TSH SERPL DL<=0.005 MIU/L-ACNC: 1.2 UIU/ML (ref 0.38–5.33)
WBC # BLD AUTO: 9.8 K/UL (ref 4.8–10.8)

## 2020-01-21 PROCEDURE — 80053 COMPREHEN METABOLIC PANEL: CPT

## 2020-01-21 PROCEDURE — 82306 VITAMIN D 25 HYDROXY: CPT

## 2020-01-21 PROCEDURE — 99214 OFFICE O/P EST MOD 30 MIN: CPT | Performed by: INTERNAL MEDICINE

## 2020-01-21 PROCEDURE — 85025 COMPLETE CBC W/AUTO DIFF WBC: CPT

## 2020-01-21 PROCEDURE — 36415 COLL VENOUS BLD VENIPUNCTURE: CPT

## 2020-01-21 PROCEDURE — 84443 ASSAY THYROID STIM HORMONE: CPT

## 2020-01-21 RX ORDER — OMEPRAZOLE 20 MG/1
CAPSULE, DELAYED RELEASE ORAL
Qty: 90 CAP | Refills: 0 | Status: SHIPPED | OUTPATIENT
Start: 2020-01-21 | End: 2020-04-03

## 2020-01-21 ASSESSMENT — PATIENT HEALTH QUESTIONNAIRE - PHQ9: CLINICAL INTERPRETATION OF PHQ2 SCORE: 0

## 2020-01-21 NOTE — PROGRESS NOTES
CHIEF COMPLAINT  Chief Complaint   Patient presents with   • Other     stomach issues/ bloody stools     HPI  Marilin Lopez is a 39 y.o. female who presents today for the following     GI bleed, BRBPR, N/hematemesis, Dysphagia, fatigue  C/o decreased appetite, N, loose stool with mucus/blood shortly after meal since summer 2019, ~ 6 months ago.  She has used ibuprofen frequently, and in the last week daily.  Accompanied with:   -transient dark colored /non bilious vomiting 2 months ago  -dysphagia  - early satiety  -fatigue.    Denies:   -heartburn, epigastric pain.   -abnormal cough  -Lightheadedness, dizziness  - tobacco use.    She gained WT.    Reviewed PMH, PSH, FH, SH, ALL, HCM/IMM, no changes  Reviewed MEDS, no changes    Patient Active Problem List    Diagnosis Date Noted   • CECILIO (obstructive sleep apnea) 10/03/2019   • Overweight 10/03/2019   • Need for influenza vaccination 10/03/2019   • IUD (intrauterine device) in place 06/04/2019   • Health care maintenance 04/04/2019   • Annual physical exam 04/04/2019   • Tobacco use 04/04/2019     CURRENT MEDICATIONS  Current Outpatient Medications   Medication Sig Dispense Refill   • omeprazole (PRILOSEC) 20 MG delayed-release capsule Take 1 cap BID for 2 weeks, then in AMs before breakfast 90 Cap 0   • albuterol 108 (90 Base) MCG/ACT Aero Soln inhalation aerosol Inhale 2 Puffs by mouth.  5   • cetirizine (ZYRTEC) 10 MG Tab Take 10 mg by mouth every day.     • doxycycline (VIBRAMYCIN) 100 MG Cap Take 100 mg by mouth.     • clindamycin (CLEOCIN T) 1 % Gel Apply 1 Application to affected area(s).     • benzonatate (TESSALON) 100 MG Cap Take 1 Cap by mouth 3 times a day as needed. (Patient not taking: Reported on 1/9/2020) 30 Cap 0   • ondansetron (ZOFRAN) 4 MG Tab tablet Take 1 Tab by mouth every four hours as needed for Nausea/Vomiting. (Patient not taking: Reported on 1/21/2020) 20 Tab 0     No current facility-administered medications for this visit.   "    ALLERGIES  Allergies: Patient has no known allergies.  PAST MEDICAL HISTORY  Past Medical History:   Diagnosis Date   • Chickenpox    • Influenza    • Lactose intolerance      SURGICAL HISTORY  She  has a past surgical history that includes primary c section.  SOCIAL HISTORY  Social History     Tobacco Use   • Smoking status: Current Every Day Smoker     Packs/day: 0.25     Types: Cigars   • Smokeless tobacco: Never Used   • Tobacco comment: 6/day   Substance Use Topics   • Alcohol use: Yes     Alcohol/week: 0.6 oz     Types: 1 Glasses of wine per week   • Drug use: No     Social History     Patient does not qualify to have social determinant information on file (likely too young).   Social History Narrative   • Not on file     FAMILY HISTORY  Family History   Problem Relation Age of Onset   • Hypertension Mother    • Diabetes Mother    • Hyperlipidemia Mother    • Hypertension Brother      Family Status   Relation Name Status   • Mo  Alive   • Fa  Alive   • Bro  Alive     ROS   Constitutional: Negative for fever, chills.  HENT: Negative for congestion, sore throat.  Eyes: Negative for vision problems.   Respiratory: Negative for cough, shortness of breath.  Cardiovascular: Negative for chest pain, palpitations.   Gastrointestinal: as above.  Genitourinary: Negative for dysuria.  Musculoskeletal: Negative for back pain.   Skin: Negative for rash.   Neuro: Negative for dizziness, weakness and headaches.   Endo/Heme/Allergies: Does not bruise/bleed easily.   Psychiatric/Behavioral: Negative for depression.    PHYSICAL EXAM   Blood Pressure 110/70 (BP Location: Left arm, Patient Position: Sitting, BP Cuff Size: Adult)   Pulse 73   Temperature 37.3 °C (99.1 °F)   Height 1.676 m (5' 6\")   Weight 80.3 kg (177 lb)   Oxygen Saturation 98%   Body Mass Index 28.57 kg/m²   General:  NAD, well appearing  HEENT:   NC/AT, PERRLA, EOMI, TMs are clear. Oropharyngeal mucosa is pink,  without lesions;  no cervical LAD.  "   Cardiovascular: RRR.   No m/r/g.       Lungs:   CTAB, no w/r/r, no respiratory distress.  Abdomen: Soft, NT/ND; no hepatosplenomegaly.  Extremities:  2+ DP and radial pulses bilaterally.  No c/c/e.   Skin:  Warm, dry.  No erythema. No rash.   Neurologic: Alert & oriented x 3. CN II-XII grossly intact. No focal deficits.  Psychiatric:  Affect normal, mood normal, judgment normal.    Labs     Pending    Imaging     None    Assessment and Plan     Marilin Lopez is a 39 y.o. female    1. Gastrointestinal hemorrhage, unspecified gastrointestinal hemorrhage type  Advised patient to stop immediately ibuprofen, not to take Aleve as well.  She is started with omeprazole, twice daily initially.  Pending labs, that she will do today.    Strict ER precautions are discussed with the patient, she verbalized understanding    - CBC WITH DIFFERENTIAL; Future  - REFERRAL TO GASTROENTEROLOGY    2. BRBPR (bright red blood per rectum)  As above  - CBC WITH DIFFERENTIAL; Future  - REFERRAL TO GASTROENTEROLOGY  3. Nausea  - omeprazole (PRILOSEC) 20 MG delayed-release capsule; Take 1 cap BID for 2 weeks, then in AMs before breakfast  Dispense: 90 Cap; Refill: 0  - Comp Metabolic Panel; Future  - REFERRAL TO GASTROENTEROLOGY  4. Hematemesis with nausea  - REFERRAL TO GASTROENTEROLOGY    5. Fatigue, unspecified type  Pending labs  - CBC WITH DIFFERENTIAL; Future  - Comp Metabolic Panel; Future  - TSH; Future    6. Hypovitaminosis D  She had low vitamin D level in the past  - VITAMIN D,25 HYDROXY; Future    7. Screening for malignant neoplasm of breast  - MA-SCREEN MAMMO W/CAD-BILAT; Future    Counseling:   - Smoking:  Nonsmoker    Followup: prn    All questions are answered.    Please note that this dictation was created using voice recognition software, and that there might be errors of saima and possibly content.

## 2020-01-29 ENCOUNTER — OFFICE VISIT (OUTPATIENT)
Dept: MEDICAL GROUP | Facility: MEDICAL CENTER | Age: 40
End: 2020-01-29
Payer: COMMERCIAL

## 2020-01-29 VITALS
BODY MASS INDEX: 28.7 KG/M2 | TEMPERATURE: 98.6 F | OXYGEN SATURATION: 94 % | HEART RATE: 92 BPM | HEIGHT: 66 IN | WEIGHT: 178.57 LBS | SYSTOLIC BLOOD PRESSURE: 108 MMHG | RESPIRATION RATE: 16 BRPM | DIASTOLIC BLOOD PRESSURE: 62 MMHG

## 2020-01-29 DIAGNOSIS — D75.89 MACROCYTOSIS: ICD-10-CM

## 2020-01-29 DIAGNOSIS — K92.0 HEMATEMESIS WITH NAUSEA: ICD-10-CM

## 2020-01-29 DIAGNOSIS — K92.2 GASTROINTESTINAL HEMORRHAGE, UNSPECIFIED GASTROINTESTINAL HEMORRHAGE TYPE: ICD-10-CM

## 2020-01-29 DIAGNOSIS — R13.10 DYSPHAGIA, UNSPECIFIED TYPE: ICD-10-CM

## 2020-01-29 DIAGNOSIS — K62.5 BRBPR (BRIGHT RED BLOOD PER RECTUM): ICD-10-CM

## 2020-01-29 DIAGNOSIS — E55.9 HYPOVITAMINOSIS D: ICD-10-CM

## 2020-01-29 PROCEDURE — 99214 OFFICE O/P EST MOD 30 MIN: CPT | Performed by: INTERNAL MEDICINE

## 2020-01-29 RX ORDER — ERGOCALCIFEROL 1.25 MG/1
50000 CAPSULE ORAL
Qty: 12 CAP | Refills: 1 | Status: SHIPPED | OUTPATIENT
Start: 2020-01-29 | End: 2020-05-12

## 2020-01-29 RX ORDER — ERGOCALCIFEROL 1.25 MG/1
50000 CAPSULE ORAL
Qty: 12 CAP | Refills: 1 | Status: SHIPPED | OUTPATIENT
Start: 2020-01-29 | End: 2020-01-29

## 2020-01-29 NOTE — PROGRESS NOTES
CHIEF COMPLAINT  Chief Complaint   Patient presents with   • Follow Up Care Management     Labs   GI bleed    HPI  Marilin Lopez is a 39 y.o. female who presents today for the following     GI bleed, BRBPR, hematemesis/N, Dysphagia  Macrocytosis  Interval course:  -Symptoms improved with omeprazole  -GI bleed subsided  -CBC came back with elevated MCV and RDW  -Pending GI evaluation in 2 days.    Background, initial symptoms  C/o decreased appetite, N, loose stool with mucus/blood shortly after meal since summer 2019, ~ 6 months ago.  She has used ibuprofen frequently, and in the last week daily.  Accompanied with:   -transient dark colored /non bilious vomiting 2 months ago  -dysphagia  - early satiety  -fatigue.     Denies:   -heartburn, epigastric pain.   -abnormal cough  -Lightheadedness, dizziness  - tobacco use.      Hypovitaminosis D  The patient had low vitamin D level at 15.  Vitamin D supplement: no.    Reviewed PMH, PSH, FH, SH, ALL, HCM/IMM, no changes  Reviewed MEDS, no changes    Patient Active Problem List    Diagnosis Date Noted   • CECILIO (obstructive sleep apnea) 10/03/2019   • Overweight 10/03/2019   • Need for influenza vaccination 10/03/2019   • IUD (intrauterine device) in place 06/04/2019   • Health care maintenance 04/04/2019   • Annual physical exam 04/04/2019   • Tobacco use 04/04/2019     CURRENT MEDICATIONS  Current Outpatient Medications   Medication Sig Dispense Refill   • vitamin D, Ergocalciferol, (DRISDOL) 38758 units Cap capsule Take 1 Cap by mouth every 7 days. 12 Cap 1   • albuterol 108 (90 Base) MCG/ACT Aero Soln inhalation aerosol Inhale 2 Puffs by mouth.  5   • cetirizine (ZYRTEC) 10 MG Tab Take 10 mg by mouth every day.     • omeprazole (PRILOSEC) 20 MG delayed-release capsule Take 1 cap BID for 2 weeks, then in AMs before breakfast 90 Cap 0   • doxycycline (VIBRAMYCIN) 100 MG Cap Take 100 mg by mouth.     • clindamycin (CLEOCIN T) 1 % Gel Apply 1 Application to affected area(s).      • benzonatate (TESSALON) 100 MG Cap Take 1 Cap by mouth 3 times a day as needed. (Patient not taking: Reported on 1/9/2020) 30 Cap 0   • ondansetron (ZOFRAN) 4 MG Tab tablet Take 1 Tab by mouth every four hours as needed for Nausea/Vomiting. (Patient not taking: Reported on 1/21/2020) 20 Tab 0     No current facility-administered medications for this visit.      ALLERGIES  Allergies: Patient has no known allergies.  PAST MEDICAL HISTORY  Past Medical History:   Diagnosis Date   • Chickenpox    • Influenza    • Lactose intolerance      SURGICAL HISTORY  She  has a past surgical history that includes primary c section.  SOCIAL HISTORY  Social History     Tobacco Use   • Smoking status: Current Every Day Smoker     Packs/day: 0.25     Types: Cigars   • Smokeless tobacco: Never Used   • Tobacco comment: 6/day   Substance Use Topics   • Alcohol use: Yes     Alcohol/week: 0.6 oz     Types: 1 Glasses of wine per week   • Drug use: No     Social History     Patient does not qualify to have social determinant information on file (likely too young).   Social History Narrative   • Not on file     FAMILY HISTORY  Family History   Problem Relation Age of Onset   • Hypertension Mother    • Diabetes Mother    • Hyperlipidemia Mother    • Hypertension Brother      Family Status   Relation Name Status   • Mo  Alive   • Fa  Alive   • Bro  Alive     ROS   Constitutional: Negative for fever, chills.  HENT: Negative for congestion, sore throat.  Eyes: Negative for vision problems.   Respiratory: Negative for cough, shortness of breath.  Cardiovascular: Negative for chest pain, palpitations.   Gastrointestinal: as above.  Genitourinary: Negative for dysuria.  Musculoskeletal: Negative for back and joint pain.   Skin: Negative for rash.   Neuro: Negative for dizziness, weakness and headaches.   Endo/Heme/Allergies: Does not bruise/bleed easily.   Psychiatric/Behavioral: Negative for depression.    PHYSICAL EXAM   Blood Pressure 108/62  "(BP Location: Left arm, Patient Position: Sitting, BP Cuff Size: Adult)   Pulse 92   Temperature 37 °C (98.6 °F) (Temporal)   Respiration 16   Height 1.676 m (5' 6\")   Weight 81 kg (178 lb 9.2 oz)   Oxygen Saturation 94%  Body mass index is 28.82 kg/m².  General:  NAD, well appearing  HEENT:   NC/AT, PERRLA, EOMI, TMs are clear. Oropharyngeal mucosa is pink,  without lesions.    Cardiovascular: RRR.   No m/r/g.       Lungs:   CTAB, no w/r/r, no respiratory distress.  Abdomen: Soft, NT/ND; no hepatosplenomegaly.  Extremities:  2+ DP and radial pulses bilaterally.  No c/c/e.   Skin:  Warm, dry.  No erythema. No rash.   Neurologic: Alert & oriented x 3. CN II-XII grossly intact. No focal deficits.  Psychiatric:  Affect normal, mood normal, judgment normal.    Labs     Labs are reviewed and discussed with a patient  No results found for: CHOLSTRLTOT, LDL, HDL, TRIGLYCERIDE    Lab Results   Component Value Date/Time    SODIUM 140 01/21/2020 10:29 AM    POTASSIUM 4.3 01/21/2020 10:29 AM    CHLORIDE 105 01/21/2020 10:29 AM    CO2 27 01/21/2020 10:29 AM    GLUCOSE 89 01/21/2020 10:29 AM    BUN 10 01/21/2020 10:29 AM    CREATININE 0.90 01/21/2020 10:29 AM     Lab Results   Component Value Date/Time    ALKPHOSPHAT 50 01/21/2020 10:29 AM    ASTSGOT 15 01/21/2020 10:29 AM    ALTSGPT 16 01/21/2020 10:29 AM    TBILIRUBIN 0.4 01/21/2020 10:29 AM      Lab Results   Component Value Date/Time    WBC 9.8 01/21/2020 10:29 AM    RBC 4.77 01/21/2020 10:29 AM    HEMOGLOBIN 15.6 01/21/2020 10:29 AM    HEMATOCRIT 47.8 (H) 01/21/2020 10:29 AM    .2 (H) 01/21/2020 10:29 AM    MCH 32.7 01/21/2020 10:29 AM    MCHC 32.6 (L) 01/21/2020 10:29 AM    MPV 9.6 01/21/2020 10:29 AM    NEUTSPOLYS 64.80 01/21/2020 10:29 AM    LYMPHOCYTES 29.60 01/21/2020 10:29 AM    MONOCYTES 4.40 01/21/2020 10:29 AM    EOSINOPHILS 0.50 01/21/2020 10:29 AM    BASOPHILS 0.50 01/21/2020 10:29 AM        Imaging     None    Assessment and Plan     Marilin Lopez is a " 39 y.o. female    1. Gastrointestinal hemorrhage, unspecified gastrointestinal hemorrhage type  2. BRBPR (bright red blood per rectum)  3. Hematemesis with nausea  4. Dysphagia, unspecified type  Reviewed the labs, no blood loss anemia.  She will have GI evaluation in 2 days    5. Macrocytosis  Follow-up labs  - VIT B12,  FOLIC ACID  - CBC WITH DIFFERENTIAL; Future    6. Hypovitaminosis D  Start high-dose vitamin D  - VITAMIN D,25 HYDROXY; Future  - vitamin D, Ergocalciferol, (DRISDOL) 27588 units Cap capsule; Take 1 Cap by mouth every 7 days.  Dispense: 12 Cap; Refill: 1    Counseling:   - Smoking:  Nonsmoker    Followup: in 6 months and prn    All questions are answered.    Please note that this dictation was created using voice recognition software, and that there might be errors of saima and possibly content.

## 2020-03-25 ENCOUNTER — SLEEP CENTER VISIT (OUTPATIENT)
Dept: SLEEP MEDICINE | Facility: MEDICAL CENTER | Age: 40
End: 2020-03-25
Payer: COMMERCIAL

## 2020-03-25 VITALS
BODY MASS INDEX: 27.97 KG/M2 | HEART RATE: 74 BPM | SYSTOLIC BLOOD PRESSURE: 128 MMHG | OXYGEN SATURATION: 94 % | WEIGHT: 174 LBS | DIASTOLIC BLOOD PRESSURE: 62 MMHG | HEIGHT: 66 IN

## 2020-03-25 DIAGNOSIS — G47.33 OSA (OBSTRUCTIVE SLEEP APNEA): ICD-10-CM

## 2020-03-25 PROCEDURE — 99214 OFFICE O/P EST MOD 30 MIN: CPT | Performed by: NURSE PRACTITIONER

## 2020-03-25 ASSESSMENT — FIBROSIS 4 INDEX: FIB4 SCORE: 0.64

## 2020-03-25 NOTE — PROGRESS NOTES
CC:  Here for f/u sleep issues as listed below    HPI:   Marilin presents today for follow up obstructive sleep apnea.      PSG from 10/2019 indicated an AHI of 5.5 and low oxygenation of 83%.  Currently she is being treated with autoCPAP @ [id number].  Compliance download from the dates 2/23/2020 - 3/23/2020 indicates she is wearing the device 60% for an avg of 4 hours and 43 minutes per night with a reduced AHI of 0.7.    She does not tolerate pressure, there are times it wakes her, feeling like she is suffocating. She likes mask well. Her partner also has CPAP and helping with changing humidity and ramp time. She wakes up refreshed and is less tired throughout the day. Resolved daytime sleepiness. They have occasional morning H/A, but improved overall. She sleeps better overall.  She will continue to clean supplies weekly and change them as insurance allows.       Patient Active Problem List    Diagnosis Date Noted   • CECILIO (obstructive sleep apnea) 10/03/2019   • Overweight 10/03/2019   • Need for influenza vaccination 10/03/2019   • IUD (intrauterine device) in place 06/04/2019   • Health care maintenance 04/04/2019   • Annual physical exam 04/04/2019   • Tobacco use 04/04/2019       Past Medical History:   Diagnosis Date   • Chickenpox    • Influenza    • Lactose intolerance        Past Surgical History:   Procedure Laterality Date   • PRIMARY C SECTION         Family History   Problem Relation Age of Onset   • Hypertension Mother    • Diabetes Mother    • Hyperlipidemia Mother    • Hypertension Brother        Social History     Socioeconomic History   • Marital status: Single     Spouse name: Not on file   • Number of children: Not on file   • Years of education: Not on file   • Highest education level: Not on file   Occupational History   • Not on file   Social Needs   • Financial resource strain: Not on file   • Food insecurity     Worry: Not on file     Inability: Not on file   • Transportation needs      Medical: Not on file     Non-medical: Not on file   Tobacco Use   • Smoking status: Current Every Day Smoker     Packs/day: 0.25     Types: Cigars   • Smokeless tobacco: Never Used   • Tobacco comment: 6/day   Substance and Sexual Activity   • Alcohol use: Yes     Alcohol/week: 0.6 oz     Types: 1 Glasses of wine per week   • Drug use: No   • Sexual activity: Not on file   Lifestyle   • Physical activity     Days per week: Not on file     Minutes per session: Not on file   • Stress: Not on file   Relationships   • Social connections     Talks on phone: Not on file     Gets together: Not on file     Attends Muslim service: Not on file     Active member of club or organization: Not on file     Attends meetings of clubs or organizations: Not on file     Relationship status: Not on file   • Intimate partner violence     Fear of current or ex partner: Not on file     Emotionally abused: Not on file     Physically abused: Not on file     Forced sexual activity: Not on file   Other Topics Concern   • Not on file   Social History Narrative   • Not on file       Current Outpatient Medications   Medication Sig Dispense Refill   • vitamin D, Ergocalciferol, (DRISDOL) 86165 units Cap capsule Take 1 Cap by mouth every 7 days. 12 Cap 1   • omeprazole (PRILOSEC) 20 MG delayed-release capsule Take 1 cap BID for 2 weeks, then in AMs before breakfast 90 Cap 0   • doxycycline (VIBRAMYCIN) 100 MG Cap Take 100 mg by mouth.     • clindamycin (CLEOCIN T) 1 % Gel Apply 1 Application to affected area(s).     • benzonatate (TESSALON) 100 MG Cap Take 1 Cap by mouth 3 times a day as needed. (Patient not taking: Reported on 1/9/2020) 30 Cap 0   • albuterol 108 (90 Base) MCG/ACT Aero Soln inhalation aerosol Inhale 2 Puffs by mouth.  5   • cetirizine (ZYRTEC) 10 MG Tab Take 10 mg by mouth every day.     • ondansetron (ZOFRAN) 4 MG Tab tablet Take 1 Tab by mouth every four hours as needed for Nausea/Vomiting. (Patient not taking: Reported on  "1/21/2020) 20 Tab 0     No current facility-administered medications for this visit.           Allergies: Patient has no known allergies.      ROS   Gen: Denies fever, chills, unintentional weight loss, fatigue  Resp:Denies Dyspnea  CV: Denies chest pain, chest tightness  Sleep:Denies morning headache, insomnia, daytime somnolence, snoring, gasping for air, apnea  Neuro: Denies frequent headaches, weakness, dizziness  See HPI.  All other systems reviewed and negative        Vital signs for this encounter:  Vitals:    03/25/20 0914   Height: 1.676 m (5' 6\")   Weight: 78.9 kg (174 lb)   Weight % change since last entry.: 0 %   BP: 128/62   Pulse: 74   BMI (Calculated): 28.08                   Physical Exam:   Gen:         Alert and oriented, No apparent distress.   Neck:        No Lymphadenopathy.  Lungs:     Clear to auscultation bilaterally.    CV:          Regular rate and rhythm. No murmurs, rubs or gallops.   Abd:         Soft non tender, non distended.            Ext:          No clubbing, cyanosis, edema.    Assessment   1. CECILIO (obstructive sleep apnea)  DME Other       Patient is clinically stable and will proceed with following plan.  Face-to-face time greater than 50% of 25 minutes reviewing: reviewed first compliance with patient and partner, answered all questions regarding cleaning, treatment, and the need to change pressure to better accommodate sleeping.      PLAN:   Patient Instructions   1) Continaue autoCPAP and change [id number]  2) Clean mask and supplies weekly and change them as insurance allows  3) Light conditioning and dietary changes encouraged  4) Encourage  smoking cessation   5) Vaccines: Up to date with Prevnar 13, Pneumovax 23, flu  6) Return in about 3 months (around 6/25/2020) for follow up with JOSSUE Davila, if not sooner, Compliance.          "

## 2020-03-25 NOTE — PATIENT INSTRUCTIONS
1) Continaue autoCPAP and change [id number]  2) Clean mask and supplies weekly and change them as insurance allows  3) Light conditioning and dietary changes encouraged  4) Encourage  smoking cessation   5) Vaccines: Up to date with Prevnar 13, Pneumovax 23, flu  6) Return in about 3 months (around 6/25/2020) for follow up with JOSSUE Davila, if not sooner, Compliance.

## 2020-04-02 DIAGNOSIS — R11.0 NAUSEA: ICD-10-CM

## 2020-04-03 RX ORDER — OMEPRAZOLE 20 MG/1
CAPSULE, DELAYED RELEASE ORAL
Qty: 90 CAP | Refills: 0 | Status: SHIPPED | OUTPATIENT
Start: 2020-04-03 | End: 2020-05-12

## 2020-05-05 ENCOUNTER — HOSPITAL ENCOUNTER (OUTPATIENT)
Dept: LAB | Facility: MEDICAL CENTER | Age: 40
End: 2020-05-05
Attending: INTERNAL MEDICINE
Payer: COMMERCIAL

## 2020-05-05 DIAGNOSIS — E55.9 HYPOVITAMINOSIS D: ICD-10-CM

## 2020-05-05 DIAGNOSIS — D75.89 MACROCYTOSIS: ICD-10-CM

## 2020-05-05 LAB
25(OH)D3 SERPL-MCNC: 36 NG/ML (ref 30–100)
BASOPHILS # BLD AUTO: 0.4 % (ref 0–1.8)
BASOPHILS # BLD: 0.03 K/UL (ref 0–0.12)
EOSINOPHIL # BLD AUTO: 0.05 K/UL (ref 0–0.51)
EOSINOPHIL NFR BLD: 0.7 % (ref 0–6.9)
ERYTHROCYTE [DISTWIDTH] IN BLOOD BY AUTOMATED COUNT: 50.5 FL (ref 35.9–50)
FOLATE SERPL-MCNC: 9.3 NG/ML
HCT VFR BLD AUTO: 44.9 % (ref 37–47)
HGB BLD-MCNC: 14.9 G/DL (ref 12–16)
IMM GRANULOCYTES # BLD AUTO: 0.01 K/UL (ref 0–0.11)
IMM GRANULOCYTES NFR BLD AUTO: 0.1 % (ref 0–0.9)
LYMPHOCYTES # BLD AUTO: 3.06 K/UL (ref 1–4.8)
LYMPHOCYTES NFR BLD: 39.9 % (ref 22–41)
MCH RBC QN AUTO: 32.3 PG (ref 27–33)
MCHC RBC AUTO-ENTMCNC: 33.2 G/DL (ref 33.6–35)
MCV RBC AUTO: 97.4 FL (ref 81.4–97.8)
MONOCYTES # BLD AUTO: 0.36 K/UL (ref 0–0.85)
MONOCYTES NFR BLD AUTO: 4.7 % (ref 0–13.4)
NEUTROPHILS # BLD AUTO: 4.16 K/UL (ref 2–7.15)
NEUTROPHILS NFR BLD: 54.2 % (ref 44–72)
NRBC # BLD AUTO: 0 K/UL
NRBC BLD-RTO: 0 /100 WBC
PLATELET # BLD AUTO: 217 K/UL (ref 164–446)
PMV BLD AUTO: 9.2 FL (ref 9–12.9)
RBC # BLD AUTO: 4.61 M/UL (ref 4.2–5.4)
VIT B12 SERPL-MCNC: 739 PG/ML (ref 211–911)
WBC # BLD AUTO: 7.7 K/UL (ref 4.8–10.8)

## 2020-05-05 PROCEDURE — 85025 COMPLETE CBC W/AUTO DIFF WBC: CPT

## 2020-05-05 PROCEDURE — 82607 VITAMIN B-12: CPT

## 2020-05-05 PROCEDURE — 82306 VITAMIN D 25 HYDROXY: CPT

## 2020-05-05 PROCEDURE — 82746 ASSAY OF FOLIC ACID SERUM: CPT

## 2020-05-05 PROCEDURE — 36415 COLL VENOUS BLD VENIPUNCTURE: CPT

## 2020-05-08 ENCOUNTER — HOSPITAL ENCOUNTER (OUTPATIENT)
Dept: RADIOLOGY | Facility: MEDICAL CENTER | Age: 40
End: 2020-05-08
Attending: INTERNAL MEDICINE
Payer: COMMERCIAL

## 2020-05-08 DIAGNOSIS — R11.0 NAUSEA: ICD-10-CM

## 2020-05-08 PROCEDURE — 76700 US EXAM ABDOM COMPLETE: CPT

## 2020-05-12 ENCOUNTER — TELEMEDICINE (OUTPATIENT)
Dept: MEDICAL GROUP | Age: 40
End: 2020-05-12
Payer: COMMERCIAL

## 2020-05-12 VITALS — WEIGHT: 170 LBS | BODY MASS INDEX: 27.32 KG/M2 | HEIGHT: 66 IN

## 2020-05-12 DIAGNOSIS — K64.9 HEMORRHOIDS, UNSPECIFIED HEMORRHOID TYPE: ICD-10-CM

## 2020-05-12 DIAGNOSIS — K63.5 POLYP OF COLON, UNSPECIFIED PART OF COLON, UNSPECIFIED TYPE: ICD-10-CM

## 2020-05-12 DIAGNOSIS — Z00.00 HEALTH CARE MAINTENANCE: ICD-10-CM

## 2020-05-12 DIAGNOSIS — Z87.19 H/O: GI BLEED: ICD-10-CM

## 2020-05-12 DIAGNOSIS — E55.9 HYPOVITAMINOSIS D: ICD-10-CM

## 2020-05-12 DIAGNOSIS — Z12.39 SCREENING FOR MALIGNANT NEOPLASM OF BREAST: ICD-10-CM

## 2020-05-12 PROCEDURE — 99214 OFFICE O/P EST MOD 30 MIN: CPT | Mod: 95,CR | Performed by: INTERNAL MEDICINE

## 2020-05-12 RX ORDER — FOLIC ACID 1 MG/1
1 TABLET ORAL DAILY
Qty: 90 TAB | Refills: 1 | Status: SHIPPED | OUTPATIENT
Start: 2020-05-12 | End: 2021-01-19 | Stop reason: SDUPTHER

## 2020-05-12 RX ORDER — ERGOCALCIFEROL 1.25 MG/1
50000 CAPSULE ORAL
Qty: 12 CAP | Refills: 1 | Status: SHIPPED | OUTPATIENT
Start: 2020-05-12 | End: 2021-01-19 | Stop reason: SDUPTHER

## 2020-05-12 ASSESSMENT — FIBROSIS 4 INDEX: FIB4 SCORE: 0.69

## 2020-05-12 NOTE — PROGRESS NOTES
Telemedicine Visit: Established Patient     This Remote Face to Face encounter was conducted via Zoom. Given the importance of social distancing and other strategies recommended to reduce the risk of COVID-19 transmission, I am providing medical care to this patient via audio/video visit in place of an in person visit at the request of the patient. Verbal consent to telehealth, risks, benefits, and consequences were discussed. Patient retains the right to withdraw at any time. All existing confidentiality protections apply. The patient has access to all transmitted medical information. No dissemination of any patient images or information to other entities without further written consent.    CHIEF COMPLAINT     Chief Complaint   Patient presents with   • Lab Results     HPI  Marilin Lopez is a 39 y.o. female who presents today for the following     GI bleed, Hemorrhoids, colon polyps  Interval course:  -Symptoms improved with omeprazole  -GI resolved  -CBC unremarkable  -GI evaluation - colonoscopy on 2/11/20, note was reviewed, findings:   - removed 2 polyps, hemorrhoids     Background, initial symptoms  C/o decreased appetite, N, loose stool with mucus/blood shortly after meal since summer 2019, ~ 6 months ago.  She has used ibuprofen frequently, and in the last week daily.  Accompanied with:   -transient dark colored /non bilious vomiting 2 months ago  -dysphagia  - early satiety  -fatigue.     Denies:   -heartburn, epigastric pain.   -abnormal cough  -Lightheadedness, dizziness  - tobacco use.      Hypovitaminosis D  The patient had low vitamin D level at 15, then 36  Vitamin D supplement: no.     Reviewed PMH, PSH, FH, SH, ALL, HCM/IMM, no changes  Reviewed MEDS, no changes    Patient Active Problem List    Diagnosis Date Noted   • CECILIO (obstructive sleep apnea) 10/03/2019   • Overweight 10/03/2019   • Need for influenza vaccination 10/03/2019   • IUD (intrauterine device) in place 06/04/2019   • Health care  maintenance 04/04/2019   • Annual physical exam 04/04/2019   • Tobacco use 04/04/2019     CURRENT MEDICATIONS  Current Outpatient Medications   Medication Sig Dispense Refill   • omeprazole (PRILOSEC) 20 MG delayed-release capsule TAKE 1 CAPSULE BY MOUTH TWICE A DAY FOR 2 WEEKS, THEN TAKE 1 CAPSULE IN THE MORNING BEFORE BREAKFAST 90 Cap 0   • vitamin D, Ergocalciferol, (DRISDOL) 39635 units Cap capsule Take 1 Cap by mouth every 7 days. 12 Cap 1   • doxycycline (VIBRAMYCIN) 100 MG Cap Take 100 mg by mouth.     • clindamycin (CLEOCIN T) 1 % Gel Apply 1 Application to affected area(s).     • benzonatate (TESSALON) 100 MG Cap Take 1 Cap by mouth 3 times a day as needed. (Patient not taking: Reported on 1/9/2020) 30 Cap 0   • albuterol 108 (90 Base) MCG/ACT Aero Soln inhalation aerosol Inhale 2 Puffs by mouth.  5   • cetirizine (ZYRTEC) 10 MG Tab Take 10 mg by mouth every day.     • ondansetron (ZOFRAN) 4 MG Tab tablet Take 1 Tab by mouth every four hours as needed for Nausea/Vomiting. (Patient not taking: Reported on 1/21/2020) 20 Tab 0     No current facility-administered medications for this visit.      ALLERGIES  Allergies: Patient has no known allergies.  PAST MEDICAL HISTORY  Past Medical History:   Diagnosis Date   • Chickenpox    • Influenza    • Lactose intolerance      SURGICAL HISTORY  She  has a past surgical history that includes primary c section.  SOCIAL HISTORY  Social History     Tobacco Use   • Smoking status: Current Every Day Smoker     Packs/day: 0.25     Types: Cigars   • Smokeless tobacco: Never Used   • Tobacco comment: 6/day   Substance Use Topics   • Alcohol use: Yes     Alcohol/week: 0.6 oz     Types: 1 Glasses of wine per week   • Drug use: No     Social History     Social History Narrative   • Not on file     FAMILY HISTORY  Family History   Problem Relation Age of Onset   • Hypertension Mother    • Diabetes Mother    • Hyperlipidemia Mother    • Hypertension Brother      Family Status  "  Relation Name Status   • Mo  Alive   • Fa  Alive   • Bro  Alive     ROS   Constitutional: Negative for fever, chills.  HENT: Negative for congestion, sore throat.  Eyes: Negative for vision problems.   Respiratory: Negative for cough, shortness of breath.  Cardiovascular: Negative for chest pain, palpitations.   Gastrointestinal: Negative for heartburn, nausea, abdominal pain. And per HPI.  Genitourinary: Negative for dysuria.  Musculoskeletal: Negative for significant myalgia, back and joint pain.   Skin: Negative for rash.   Neuro: Negative for dizziness, weakness and headaches.   Endo/Heme/Allergies: Does not bruise/bleed easily.   Psychiatric/Behavioral: Negative for depression.    Objective   Vitals obtained by patient:  Height 1.676 m (5' 6\")   Weight 77.1 kg (170 lb)   Body Mass Index 27.44 kg/m²   Physical Exam:  Constitutional: Alert, no distress, well-groomed.  Skin: No rash in visible areas.  Eye: Round. Conjunctiva clear, lids normal.  ENMT: Lips pink without lesions, good dentition. Phonation normal.  Neck: No visible masses or thyromegaly. Moves freely without pain.  CV: no peripheral cyanosis, tachycardia.  Respiratory: Unlabored respiratory effort, no cough or audible wheezing.  Psych: Alert and oriented x3, normal affect and mood.     Labs     Labs are reviewed and discussed with a patient  No results found for: CHOLSTRLTOT, LDL, HDL, TRIGLYCERIDE    Lab Results   Component Value Date/Time    SODIUM 140 01/21/2020 10:29 AM    POTASSIUM 4.3 01/21/2020 10:29 AM    CHLORIDE 105 01/21/2020 10:29 AM    CO2 27 01/21/2020 10:29 AM    GLUCOSE 89 01/21/2020 10:29 AM    BUN 10 01/21/2020 10:29 AM    CREATININE 0.90 01/21/2020 10:29 AM     Lab Results   Component Value Date/Time    ALKPHOSPHAT 50 01/21/2020 10:29 AM    ASTSGOT 15 01/21/2020 10:29 AM    ALTSGPT 16 01/21/2020 10:29 AM    TBILIRUBIN 0.4 01/21/2020 10:29 AM      No results found for: HBA1C  No results found for: TSH  No results found for: " FREET4    Lab Results   Component Value Date/Time    WBC 7.7 05/05/2020 09:15 AM    RBC 4.61 05/05/2020 09:15 AM    HEMOGLOBIN 14.9 05/05/2020 09:15 AM    HEMATOCRIT 44.9 05/05/2020 09:15 AM    MCV 97.4 05/05/2020 09:15 AM    MCH 32.3 05/05/2020 09:15 AM    MCHC 33.2 (L) 05/05/2020 09:15 AM    MPV 9.2 05/05/2020 09:15 AM    NEUTSPOLYS 54.20 05/05/2020 09:15 AM    LYMPHOCYTES 39.90 05/05/2020 09:15 AM    MONOCYTES 4.70 05/05/2020 09:15 AM    EOSINOPHILS 0.70 05/05/2020 09:15 AM    BASOPHILS 0.40 05/05/2020 09:15 AM      Imaging      None    Assessment and Plan     Marilin Lopez is a 39 y.o. female    1. H/O: GI bleed  2. Hemorrhoids, unspecified hemorrhoid type  3. Polyp of colon, unspecified part of colon, unspecified type  Should increase fluid/fiber, avoid constipation    4. Hypovitaminosis D  Start:  - vitamin D, Ergocalciferol, (DRISDOL) 1.25 MG (27908 UT) Cap capsule; Take 1 Cap by mouth every 7 days.  Dispense: 12 Cap; Refill: 1  - VITAMIN D,25 HYDROXY; Future    5. Health care maintenance  Due immunizations x 2    6. Screening for malignant neoplasm of breast  - MA-SCREENING MAMMO BILAT W/TOMOSYNTHESIS W/CAD; Future    Follow-up: in 6 months with labs

## 2020-08-06 ENCOUNTER — APPOINTMENT (OUTPATIENT)
Dept: SLEEP MEDICINE | Facility: MEDICAL CENTER | Age: 40
End: 2020-08-06
Payer: COMMERCIAL

## 2020-12-16 DIAGNOSIS — E55.9 HYPOVITAMINOSIS D: ICD-10-CM

## 2020-12-16 RX ORDER — ERGOCALCIFEROL 1.25 MG/1
50000 CAPSULE ORAL
Qty: 12 CAP | Refills: 1 | OUTPATIENT
Start: 2020-12-16

## 2021-01-19 ENCOUNTER — PATIENT MESSAGE (OUTPATIENT)
Dept: MEDICAL GROUP | Age: 41
End: 2021-01-19

## 2021-01-19 DIAGNOSIS — R53.83 FATIGUE, UNSPECIFIED TYPE: ICD-10-CM

## 2021-01-19 DIAGNOSIS — E55.9 HYPOVITAMINOSIS D: ICD-10-CM

## 2021-01-19 RX ORDER — ERGOCALCIFEROL 1.25 MG/1
50000 CAPSULE ORAL
Qty: 12 CAP | Refills: 1 | Status: SHIPPED | OUTPATIENT
Start: 2021-01-19

## 2021-01-19 RX ORDER — FOLIC ACID 1 MG/1
1 TABLET ORAL DAILY
Qty: 90 TAB | Refills: 1 | Status: SHIPPED | OUTPATIENT
Start: 2021-01-19

## 2021-01-19 NOTE — PATIENT COMMUNICATION
.Received request via: Patient    Was the patient seen in the last year in this department? Yes    Does the patient have an active prescription (recently filled or refills available) for medication(s) requested? No

## 2021-02-26 ENCOUNTER — HOSPITAL ENCOUNTER (OUTPATIENT)
Dept: LAB | Facility: MEDICAL CENTER | Age: 41
End: 2021-02-26
Attending: PSYCHIATRY & NEUROLOGY
Payer: COMMERCIAL

## 2021-02-26 LAB
ALBUMIN SERPL BCP-MCNC: 4.3 G/DL (ref 3.2–4.9)
ALBUMIN/GLOB SERPL: 1.6 G/DL
ALP SERPL-CCNC: 65 U/L (ref 30–99)
ALT SERPL-CCNC: 13 U/L (ref 2–50)
ANION GAP SERPL CALC-SCNC: 10 MMOL/L (ref 7–16)
AST SERPL-CCNC: 16 U/L (ref 12–45)
BASOPHILS # BLD AUTO: 0.3 % (ref 0–1.8)
BASOPHILS # BLD: 0.04 K/UL (ref 0–0.12)
BILIRUB SERPL-MCNC: 0.5 MG/DL (ref 0.1–1.5)
BUN SERPL-MCNC: 9 MG/DL (ref 8–22)
CALCIUM SERPL-MCNC: 9.3 MG/DL (ref 8.4–10.2)
CHLORIDE SERPL-SCNC: 101 MMOL/L (ref 96–112)
CO2 SERPL-SCNC: 24 MMOL/L (ref 20–33)
CREAT SERPL-MCNC: 0.79 MG/DL (ref 0.5–1.4)
EOSINOPHIL # BLD AUTO: 0.07 K/UL (ref 0–0.51)
EOSINOPHIL NFR BLD: 0.6 % (ref 0–6.9)
ERYTHROCYTE [DISTWIDTH] IN BLOOD BY AUTOMATED COUNT: 49.8 FL (ref 35.9–50)
GLOBULIN SER CALC-MCNC: 2.7 G/DL (ref 1.9–3.5)
GLUCOSE SERPL-MCNC: 96 MG/DL (ref 65–99)
HCT VFR BLD AUTO: 48.5 % (ref 37–47)
HGB BLD-MCNC: 16.1 G/DL (ref 12–16)
IMM GRANULOCYTES # BLD AUTO: 0.03 K/UL (ref 0–0.11)
IMM GRANULOCYTES NFR BLD AUTO: 0.3 % (ref 0–0.9)
LYMPHOCYTES # BLD AUTO: 3.17 K/UL (ref 1–4.8)
LYMPHOCYTES NFR BLD: 27.1 % (ref 22–41)
MCH RBC QN AUTO: 32.3 PG (ref 27–33)
MCHC RBC AUTO-ENTMCNC: 33.2 G/DL (ref 33.6–35)
MCV RBC AUTO: 97.4 FL (ref 81.4–97.8)
MONOCYTES # BLD AUTO: 0.44 K/UL (ref 0–0.85)
MONOCYTES NFR BLD AUTO: 3.8 % (ref 0–13.4)
NEUTROPHILS # BLD AUTO: 7.94 K/UL (ref 2–7.15)
NEUTROPHILS NFR BLD: 67.9 % (ref 44–72)
NRBC # BLD AUTO: 0 K/UL
NRBC BLD-RTO: 0 /100 WBC
PLATELET # BLD AUTO: 181 K/UL (ref 164–446)
PMV BLD AUTO: 10.3 FL (ref 9–12.9)
POTASSIUM SERPL-SCNC: 4.1 MMOL/L (ref 3.6–5.5)
PROT SERPL-MCNC: 7 G/DL (ref 6–8.2)
RBC # BLD AUTO: 4.98 M/UL (ref 4.2–5.4)
SODIUM SERPL-SCNC: 135 MMOL/L (ref 135–145)
T4 FREE SERPL-MCNC: 1.2 NG/DL (ref 0.93–1.7)
TSH SERPL DL<=0.005 MIU/L-ACNC: 1.52 UIU/ML (ref 0.38–5.33)
WBC # BLD AUTO: 11.7 K/UL (ref 4.8–10.8)

## 2021-02-26 PROCEDURE — 84439 ASSAY OF FREE THYROXINE: CPT

## 2021-02-26 PROCEDURE — 84443 ASSAY THYROID STIM HORMONE: CPT

## 2021-02-26 PROCEDURE — 81291 MTHFR GENE: CPT

## 2021-02-26 PROCEDURE — 85025 COMPLETE CBC W/AUTO DIFF WBC: CPT

## 2021-02-26 PROCEDURE — 80053 COMPREHEN METABOLIC PANEL: CPT

## 2021-02-26 PROCEDURE — 80307 DRUG TEST PRSMV CHEM ANLYZR: CPT

## 2021-02-26 PROCEDURE — 82607 VITAMIN B-12: CPT

## 2021-02-26 PROCEDURE — 36415 COLL VENOUS BLD VENIPUNCTURE: CPT

## 2021-02-26 PROCEDURE — 82746 ASSAY OF FOLIC ACID SERUM: CPT

## 2021-02-26 PROCEDURE — 82306 VITAMIN D 25 HYDROXY: CPT

## 2021-02-27 LAB
25(OH)D3 SERPL-MCNC: 50 NG/ML (ref 30–100)
FOLATE SERPL-MCNC: 15.4 NG/ML
VIT B12 SERPL-MCNC: 793 PG/ML (ref 211–911)

## 2021-02-28 LAB
AMPHETAMINES SERPL QL SCN: NEGATIVE NG/ML
ANNOTATION COMMENT IMP: NORMAL
BARBITURATES SERPL QL SCN: NEGATIVE NG/ML
BENZODIAZ SERPL QL SCN: NEGATIVE NG/ML
BUPRENORPHINE SERPL-MCNC: NEGATIVE NG/ML
CANNABINOIDS SERPL QL SCN: NEGATIVE NG/ML
COCAINE SERPL QL SCN: NEGATIVE NG/ML
METHADONE SERPL QL SCN: NEGATIVE NG/ML
METHAMPHET SERPL QL: NEGATIVE NG/ML
OPIATES SERPL QL SCN: NEGATIVE NG/ML
OXYCODONE SERPL QL: NEGATIVE NG/ML
PCP SERPL QL SCN: NEGATIVE NG/ML

## 2021-03-03 LAB
MTHFR C.1298A>C GENO BLD/T: NEGATIVE
MTHFR C.677C>T GENO BLD/T: NEGATIVE
MTHFR GENE MUT ANL BLD/T: NORMAL

## 2021-03-10 ENCOUNTER — PRE-ADMISSION TESTING (OUTPATIENT)
Dept: ADMISSIONS | Facility: MEDICAL CENTER | Age: 41
End: 2021-03-10
Attending: OBSTETRICS & GYNECOLOGY
Payer: COMMERCIAL

## 2021-03-10 DIAGNOSIS — Z01.810 PRE-OPERATIVE CARDIOVASCULAR EXAMINATION: ICD-10-CM

## 2021-03-10 DIAGNOSIS — Z01.812 PRE-OPERATIVE LABORATORY EXAMINATION: ICD-10-CM

## 2021-03-10 LAB
ABO GROUP BLD: NORMAL
ANION GAP SERPL CALC-SCNC: 12 MMOL/L (ref 7–16)
APPEARANCE UR: CLEAR
BASOPHILS # BLD AUTO: 0.6 % (ref 0–1.8)
BASOPHILS # BLD: 0.07 K/UL (ref 0–0.12)
BILIRUB UR QL STRIP.AUTO: NEGATIVE
BLD GP AB SCN SERPL QL: NORMAL
BUN SERPL-MCNC: 8 MG/DL (ref 8–22)
CALCIUM SERPL-MCNC: 9.9 MG/DL (ref 8.5–10.5)
CHLORIDE SERPL-SCNC: 98 MMOL/L (ref 96–112)
CO2 SERPL-SCNC: 24 MMOL/L (ref 20–33)
COLOR UR: YELLOW
CREAT SERPL-MCNC: 1.01 MG/DL (ref 0.5–1.4)
EKG IMPRESSION: NORMAL
EOSINOPHIL # BLD AUTO: 0.07 K/UL (ref 0–0.51)
EOSINOPHIL NFR BLD: 0.6 % (ref 0–6.9)
ERYTHROCYTE [DISTWIDTH] IN BLOOD BY AUTOMATED COUNT: 56.2 FL (ref 35.9–50)
GLUCOSE SERPL-MCNC: 75 MG/DL (ref 65–99)
GLUCOSE UR STRIP.AUTO-MCNC: NEGATIVE MG/DL
HCT VFR BLD AUTO: 52.1 % (ref 37–47)
HGB BLD-MCNC: 16.5 G/DL (ref 12–16)
IMM GRANULOCYTES # BLD AUTO: 0.02 K/UL (ref 0–0.11)
IMM GRANULOCYTES NFR BLD AUTO: 0.2 % (ref 0–0.9)
KETONES UR STRIP.AUTO-MCNC: NEGATIVE MG/DL
LEUKOCYTE ESTERASE UR QL STRIP.AUTO: NEGATIVE
LYMPHOCYTES # BLD AUTO: 4.4 K/UL (ref 1–4.8)
LYMPHOCYTES NFR BLD: 40.7 % (ref 22–41)
MCH RBC QN AUTO: 33.4 PG (ref 27–33)
MCHC RBC AUTO-ENTMCNC: 31.7 G/DL (ref 33.6–35)
MCV RBC AUTO: 105.5 FL (ref 81.4–97.8)
MICRO URNS: NORMAL
MONOCYTES # BLD AUTO: 0.44 K/UL (ref 0–0.85)
MONOCYTES NFR BLD AUTO: 4.1 % (ref 0–13.4)
NEUTROPHILS # BLD AUTO: 5.82 K/UL (ref 2–7.15)
NEUTROPHILS NFR BLD: 53.8 % (ref 44–72)
NITRITE UR QL STRIP.AUTO: NEGATIVE
NRBC # BLD AUTO: 0 K/UL
NRBC BLD-RTO: 0 /100 WBC
PH UR STRIP.AUTO: 6 [PH] (ref 5–8)
PLATELET # BLD AUTO: 157 K/UL (ref 164–446)
PMV BLD AUTO: 11.4 FL (ref 9–12.9)
POTASSIUM SERPL-SCNC: 3.9 MMOL/L (ref 3.6–5.5)
PROT UR QL STRIP: NEGATIVE MG/DL
RBC # BLD AUTO: 4.94 M/UL (ref 4.2–5.4)
RBC UR QL AUTO: NEGATIVE
RH BLD: NORMAL
SODIUM SERPL-SCNC: 134 MMOL/L (ref 135–145)
SP GR UR STRIP.AUTO: 1.02
UROBILINOGEN UR STRIP.AUTO-MCNC: 0.2 MG/DL
WBC # BLD AUTO: 10.8 K/UL (ref 4.8–10.8)

## 2021-03-10 PROCEDURE — 86900 BLOOD TYPING SEROLOGIC ABO: CPT

## 2021-03-10 PROCEDURE — 86850 RBC ANTIBODY SCREEN: CPT

## 2021-03-10 PROCEDURE — 86901 BLOOD TYPING SEROLOGIC RH(D): CPT

## 2021-03-10 PROCEDURE — 85025 COMPLETE CBC W/AUTO DIFF WBC: CPT

## 2021-03-10 PROCEDURE — 93010 ELECTROCARDIOGRAM REPORT: CPT | Performed by: INTERNAL MEDICINE

## 2021-03-10 PROCEDURE — 81003 URINALYSIS AUTO W/O SCOPE: CPT

## 2021-03-10 PROCEDURE — 80048 BASIC METABOLIC PNL TOTAL CA: CPT

## 2021-03-10 PROCEDURE — 36415 COLL VENOUS BLD VENIPUNCTURE: CPT

## 2021-03-10 PROCEDURE — 93005 ELECTROCARDIOGRAM TRACING: CPT

## 2021-03-10 RX ORDER — ACETAMINOPHEN 325 MG/1
650 TABLET ORAL EVERY 4 HOURS PRN
COMMUNITY

## 2021-03-10 RX ORDER — PHENOL 1.4 %
AEROSOL, SPRAY (ML) MUCOUS MEMBRANE NIGHTLY PRN
COMMUNITY

## 2021-03-10 ASSESSMENT — FIBROSIS 4 INDEX: FIB4 SCORE: 1.01

## 2021-03-12 NOTE — H&P
DATE OF ADMISSION:  2021     HISTORY OF PRESENT ILLNESS:  The patient is 40 years old  1, para 1   with 1 , seen for abnormal uterine bleeding, currently has Mirena IUD   for control of abnormal uterine bleeding, but has been breaking through with   that and is tired of this and wants to go ahead with a hysterectomy.  Pelvic   ultrasound shows IUD in place.  A small right ovarian cyst was noted on   ultrasound, simple appearing, otherwise 1.6 cm small fibroid noted in the   uterine wall.  Endometrial biopsy done suggestive of inactive endometrial   glands and ___ stroma consistent with hormonal effects.  No polyp,   hyperplasia, atypia or malignancy noted.  The patient scheduled for total   laparoscopic hysterectomy, bilateral salpingectomy, possible laparotomy,   possible cystoscopy, here for preop appointment.     MEDICAL HISTORY:  History of asthma, takes medications on as needed basis.     OBSTETRIC HISTORY:   1, para 1, one , currently has a female   partner, is complete with family.     SOCIAL HISTORY:  Current everyday tobacco user, social use of alcohol.     PAST SURGICAL HISTORY:  1 .     FAMILY HISTORY:  Mother has asthma.  Grandmother has asthma.     ALLERGIES:  No known drug allergies.     REVIEW OF SYSTEMS:  The patient is alert and oriented.  Denies shortness of   breath, chest pain or palpitation.  Complaints of constipation, has to use a   stool softener as needed basis, has a Mirena IUD currently.     PHYSICAL EXAMINATION:  VITAL SIGNS:  See EMR for current vitals.  CONSTITUTIONAL:  The patient is awake, alert, well-developed, well-nourished,   well-groomed.  RESPIRATORY:  The patient is relaxed, breathes without effort.  LUNGS:  Clear to auscultate.  No crackles, wheezes or rhonchi.  HEART:  Rate is normal, rhythm is regular.  S1, S2 normal.  No murmurs,   gallops or rubs.  GASTROINTESTINAL:  Abdomen is soft, nontender, no guarding or rigidity.   Bowel   sounds are normal.  No palpable masses, no hepatosplenomegaly.     IMPRESSION:  A 40-year-old  1, para 1, 1  with abnormal   uterine bleeding, failed medical management.  Currently has a Mirena IUD and   has been bleeding through that.  Pelvic ultrasound suggestive of a small   simple cyst 3 cm on the right ovary.  Endometrial thickness was 5.4 with IUD   in place.  A small fibroid of 1.7 mm.  The patient scheduled for total   laparoscopic hysterectomy with bilateral salpingectomy, possible unilateral   oophorectomy, possible laparotomy.  Preoperative instructions was given.    Operative procedure discussed in detail.  Postoperative recovery explained.    The patient and partner both understands.  All questions answered to   satisfaction.        ______________________________  MD KORTNEY Kirkland/VALERY/MIHAI    DD:  2021 13:05  DT:  2021 15:08    Job#:  [id number]

## 2021-03-20 ENCOUNTER — PRE-ADMISSION TESTING (OUTPATIENT)
Dept: ADMISSIONS | Facility: MEDICAL CENTER | Age: 41
End: 2021-03-20
Attending: OBSTETRICS & GYNECOLOGY
Payer: COMMERCIAL

## 2021-03-20 DIAGNOSIS — Z01.812 PRE-OPERATIVE LABORATORY EXAMINATION: ICD-10-CM

## 2021-03-20 LAB
COVID ORDER STATUS COVID19: NORMAL
SARS-COV-2 RNA RESP QL NAA+PROBE: NOTDETECTED
SPECIMEN SOURCE: NORMAL

## 2021-03-20 PROCEDURE — U0003 INFECTIOUS AGENT DETECTION BY NUCLEIC ACID (DNA OR RNA); SEVERE ACUTE RESPIRATORY SYNDROME CORONAVIRUS 2 (SARS-COV-2) (CORONAVIRUS DISEASE [COVID-19]), AMPLIFIED PROBE TECHNIQUE, MAKING USE OF HIGH THROUGHPUT TECHNOLOGIES AS DESCRIBED BY CMS-2020-01-R: HCPCS

## 2021-03-20 PROCEDURE — C9803 HOPD COVID-19 SPEC COLLECT: HCPCS

## 2021-03-20 PROCEDURE — U0005 INFEC AGEN DETEC AMPLI PROBE: HCPCS

## 2021-03-24 ENCOUNTER — ANESTHESIA (OUTPATIENT)
Dept: SURGERY | Facility: MEDICAL CENTER | Age: 41
End: 2021-03-24
Payer: COMMERCIAL

## 2021-03-24 ENCOUNTER — HOSPITAL ENCOUNTER (OUTPATIENT)
Facility: MEDICAL CENTER | Age: 41
End: 2021-03-24
Attending: OBSTETRICS & GYNECOLOGY | Admitting: OBSTETRICS & GYNECOLOGY
Payer: COMMERCIAL

## 2021-03-24 ENCOUNTER — ANESTHESIA EVENT (OUTPATIENT)
Dept: SURGERY | Facility: MEDICAL CENTER | Age: 41
End: 2021-03-24
Payer: COMMERCIAL

## 2021-03-24 VITALS
HEIGHT: 66 IN | HEART RATE: 66 BPM | DIASTOLIC BLOOD PRESSURE: 59 MMHG | BODY MASS INDEX: 27.81 KG/M2 | TEMPERATURE: 97.1 F | SYSTOLIC BLOOD PRESSURE: 105 MMHG | RESPIRATION RATE: 14 BRPM | OXYGEN SATURATION: 96 % | WEIGHT: 173.06 LBS

## 2021-03-24 DIAGNOSIS — G89.18 POST-OP PAIN: ICD-10-CM

## 2021-03-24 LAB
HCG SERPL QL: NEGATIVE
HCG UR QL: NEGATIVE
PATHOLOGY CONSULT NOTE: NORMAL

## 2021-03-24 PROCEDURE — 160035 HCHG PACU - 1ST 60 MINS PHASE I: Performed by: OBSTETRICS & GYNECOLOGY

## 2021-03-24 PROCEDURE — 700111 HCHG RX REV CODE 636 W/ 250 OVERRIDE (IP): Performed by: ANESTHESIOLOGY

## 2021-03-24 PROCEDURE — A9270 NON-COVERED ITEM OR SERVICE: HCPCS | Performed by: OBSTETRICS & GYNECOLOGY

## 2021-03-24 PROCEDURE — 700102 HCHG RX REV CODE 250 W/ 637 OVERRIDE(OP): Performed by: ANESTHESIOLOGY

## 2021-03-24 PROCEDURE — 88307 TISSUE EXAM BY PATHOLOGIST: CPT

## 2021-03-24 PROCEDURE — 502704 HCHG DEVICE, LIGASURE IMPACT: Performed by: OBSTETRICS & GYNECOLOGY

## 2021-03-24 PROCEDURE — 501838 HCHG SUTURE GENERAL: Performed by: OBSTETRICS & GYNECOLOGY

## 2021-03-24 PROCEDURE — 500868 HCHG NEEDLE, SURGI(VARES): Performed by: OBSTETRICS & GYNECOLOGY

## 2021-03-24 PROCEDURE — 160009 HCHG ANES TIME/MIN: Performed by: OBSTETRICS & GYNECOLOGY

## 2021-03-24 PROCEDURE — 160025 RECOVERY II MINUTES (STATS): Performed by: OBSTETRICS & GYNECOLOGY

## 2021-03-24 PROCEDURE — 501664 HCHG TUBING, FILTER STRYKER: Performed by: OBSTETRICS & GYNECOLOGY

## 2021-03-24 PROCEDURE — 160029 HCHG SURGERY MINUTES - 1ST 30 MINS LEVEL 4: Performed by: OBSTETRICS & GYNECOLOGY

## 2021-03-24 PROCEDURE — 81025 URINE PREGNANCY TEST: CPT

## 2021-03-24 PROCEDURE — 160036 HCHG PACU - EA ADDL 30 MINS PHASE I: Performed by: OBSTETRICS & GYNECOLOGY

## 2021-03-24 PROCEDURE — 84703 CHORIONIC GONADOTROPIN ASSAY: CPT

## 2021-03-24 PROCEDURE — 700111 HCHG RX REV CODE 636 W/ 250 OVERRIDE (IP)

## 2021-03-24 PROCEDURE — 500522 HCHG ENDOSTITCH SUTURING DEVICE: Performed by: OBSTETRICS & GYNECOLOGY

## 2021-03-24 PROCEDURE — 160046 HCHG PACU - 1ST 60 MINS PHASE II: Performed by: OBSTETRICS & GYNECOLOGY

## 2021-03-24 PROCEDURE — 160048 HCHG OR STATISTICAL LEVEL 1-5: Performed by: OBSTETRICS & GYNECOLOGY

## 2021-03-24 PROCEDURE — 700105 HCHG RX REV CODE 258: Performed by: OBSTETRICS & GYNECOLOGY

## 2021-03-24 PROCEDURE — 700101 HCHG RX REV CODE 250: Performed by: ANESTHESIOLOGY

## 2021-03-24 PROCEDURE — 501582 HCHG TROCAR, THRD BLADED: Performed by: OBSTETRICS & GYNECOLOGY

## 2021-03-24 PROCEDURE — 160041 HCHG SURGERY MINUTES - EA ADDL 1 MIN LEVEL 4: Performed by: OBSTETRICS & GYNECOLOGY

## 2021-03-24 PROCEDURE — 500800 HCHG LAPAROSCOPIC J/L HOOK: Performed by: OBSTETRICS & GYNECOLOGY

## 2021-03-24 PROCEDURE — 160002 HCHG RECOVERY MINUTES (STAT): Performed by: OBSTETRICS & GYNECOLOGY

## 2021-03-24 PROCEDURE — 501330 HCHG SET, CYSTO IRRIG TUBING: Performed by: OBSTETRICS & GYNECOLOGY

## 2021-03-24 PROCEDURE — A9270 NON-COVERED ITEM OR SERVICE: HCPCS | Performed by: ANESTHESIOLOGY

## 2021-03-24 PROCEDURE — 700101 HCHG RX REV CODE 250: Performed by: OBSTETRICS & GYNECOLOGY

## 2021-03-24 PROCEDURE — 500886 HCHG PACK, LAPAROSCOPY: Performed by: OBSTETRICS & GYNECOLOGY

## 2021-03-24 PROCEDURE — 160047 HCHG PACU  - EA ADDL 30 MINS PHASE II: Performed by: OBSTETRICS & GYNECOLOGY

## 2021-03-24 RX ORDER — DEXAMETHASONE SODIUM PHOSPHATE 4 MG/ML
INJECTION, SOLUTION INTRA-ARTICULAR; INTRALESIONAL; INTRAMUSCULAR; INTRAVENOUS; SOFT TISSUE PRN
Status: DISCONTINUED | OUTPATIENT
Start: 2021-03-24 | End: 2021-03-24 | Stop reason: SURG

## 2021-03-24 RX ORDER — SODIUM CHLORIDE, SODIUM LACTATE, POTASSIUM CHLORIDE, CALCIUM CHLORIDE 600; 310; 30; 20 MG/100ML; MG/100ML; MG/100ML; MG/100ML
INJECTION, SOLUTION INTRAVENOUS CONTINUOUS
Status: DISCONTINUED | OUTPATIENT
Start: 2021-03-24 | End: 2021-03-24 | Stop reason: HOSPADM

## 2021-03-24 RX ORDER — OXYCODONE HYDROCHLORIDE AND ACETAMINOPHEN 5; 325 MG/1; MG/1
1 TABLET ORAL EVERY 4 HOURS PRN
Qty: 20 TABLET | Refills: 0 | Status: SHIPPED | OUTPATIENT
Start: 2021-03-24 | End: 2021-03-31

## 2021-03-24 RX ORDER — OXYCODONE HCL 5 MG/5 ML
5 SOLUTION, ORAL ORAL
Status: COMPLETED | OUTPATIENT
Start: 2021-03-24 | End: 2021-03-24

## 2021-03-24 RX ORDER — DIPHENHYDRAMINE HYDROCHLORIDE 50 MG/ML
12.5 INJECTION INTRAMUSCULAR; INTRAVENOUS
Status: DISCONTINUED | OUTPATIENT
Start: 2021-03-24 | End: 2021-03-24 | Stop reason: HOSPADM

## 2021-03-24 RX ORDER — MEPERIDINE HYDROCHLORIDE 25 MG/ML
12.5 INJECTION INTRAMUSCULAR; INTRAVENOUS; SUBCUTANEOUS
Status: DISCONTINUED | OUTPATIENT
Start: 2021-03-24 | End: 2021-03-24 | Stop reason: HOSPADM

## 2021-03-24 RX ORDER — HYDROMORPHONE HYDROCHLORIDE 1 MG/ML
0.1 INJECTION, SOLUTION INTRAMUSCULAR; INTRAVENOUS; SUBCUTANEOUS
Status: DISCONTINUED | OUTPATIENT
Start: 2021-03-24 | End: 2021-03-24

## 2021-03-24 RX ORDER — OXYCODONE HYDROCHLORIDE AND ACETAMINOPHEN 5; 325 MG/1; MG/1
1 TABLET ORAL EVERY 4 HOURS PRN
Status: DISCONTINUED | OUTPATIENT
Start: 2021-03-24 | End: 2021-03-24 | Stop reason: HOSPADM

## 2021-03-24 RX ORDER — KETOROLAC TROMETHAMINE 30 MG/ML
INJECTION, SOLUTION INTRAMUSCULAR; INTRAVENOUS PRN
Status: DISCONTINUED | OUTPATIENT
Start: 2021-03-24 | End: 2021-03-24 | Stop reason: SURG

## 2021-03-24 RX ORDER — ROCURONIUM BROMIDE 10 MG/ML
INJECTION, SOLUTION INTRAVENOUS PRN
Status: DISCONTINUED | OUTPATIENT
Start: 2021-03-24 | End: 2021-03-24 | Stop reason: SURG

## 2021-03-24 RX ORDER — ONDANSETRON 2 MG/ML
INJECTION INTRAMUSCULAR; INTRAVENOUS PRN
Status: DISCONTINUED | OUTPATIENT
Start: 2021-03-24 | End: 2021-03-24 | Stop reason: SURG

## 2021-03-24 RX ORDER — SODIUM CHLORIDE, SODIUM LACTATE, POTASSIUM CHLORIDE, CALCIUM CHLORIDE 600; 310; 30; 20 MG/100ML; MG/100ML; MG/100ML; MG/100ML
INJECTION, SOLUTION INTRAVENOUS CONTINUOUS
Status: ACTIVE | OUTPATIENT
Start: 2021-03-24 | End: 2021-03-24

## 2021-03-24 RX ORDER — HYDROMORPHONE HYDROCHLORIDE 1 MG/ML
0.2 INJECTION, SOLUTION INTRAMUSCULAR; INTRAVENOUS; SUBCUTANEOUS
Status: DISCONTINUED | OUTPATIENT
Start: 2021-03-24 | End: 2021-03-24

## 2021-03-24 RX ORDER — ACETAMINOPHEN 500 MG
1000 TABLET ORAL ONCE
Status: COMPLETED | OUTPATIENT
Start: 2021-03-24 | End: 2021-03-24

## 2021-03-24 RX ORDER — HYDROMORPHONE HYDROCHLORIDE 1 MG/ML
0.4 INJECTION, SOLUTION INTRAMUSCULAR; INTRAVENOUS; SUBCUTANEOUS
Status: DISCONTINUED | OUTPATIENT
Start: 2021-03-24 | End: 2021-03-24

## 2021-03-24 RX ORDER — MIDAZOLAM HYDROCHLORIDE 1 MG/ML
INJECTION INTRAMUSCULAR; INTRAVENOUS PRN
Status: DISCONTINUED | OUTPATIENT
Start: 2021-03-24 | End: 2021-03-24 | Stop reason: SURG

## 2021-03-24 RX ORDER — OXYCODONE HCL 5 MG/5 ML
10 SOLUTION, ORAL ORAL
Status: COMPLETED | OUTPATIENT
Start: 2021-03-24 | End: 2021-03-24

## 2021-03-24 RX ORDER — HYDROMORPHONE HYDROCHLORIDE 1 MG/ML
1 INJECTION, SOLUTION INTRAMUSCULAR; INTRAVENOUS; SUBCUTANEOUS ONCE
Status: COMPLETED | OUTPATIENT
Start: 2021-03-24 | End: 2021-03-24

## 2021-03-24 RX ORDER — HALOPERIDOL 5 MG/ML
1 INJECTION INTRAMUSCULAR
Status: DISCONTINUED | OUTPATIENT
Start: 2021-03-24 | End: 2021-03-24 | Stop reason: HOSPADM

## 2021-03-24 RX ORDER — BUPIVACAINE HYDROCHLORIDE 2.5 MG/ML
INJECTION, SOLUTION EPIDURAL; INFILTRATION; INTRACAUDAL
Status: DISCONTINUED
Start: 2021-03-24 | End: 2021-03-24 | Stop reason: HOSPADM

## 2021-03-24 RX ORDER — CEFAZOLIN SODIUM 1 G/3ML
INJECTION, POWDER, FOR SOLUTION INTRAMUSCULAR; INTRAVENOUS PRN
Status: DISCONTINUED | OUTPATIENT
Start: 2021-03-24 | End: 2021-03-24 | Stop reason: SURG

## 2021-03-24 RX ORDER — ONDANSETRON 2 MG/ML
4 INJECTION INTRAMUSCULAR; INTRAVENOUS
Status: COMPLETED | OUTPATIENT
Start: 2021-03-24 | End: 2021-03-24

## 2021-03-24 RX ORDER — IBUPROFEN 600 MG/1
600 TABLET ORAL EVERY 6 HOURS PRN
Status: DISCONTINUED | OUTPATIENT
Start: 2021-03-24 | End: 2021-03-24 | Stop reason: HOSPADM

## 2021-03-24 RX ADMIN — OXYCODONE HYDROCHLORIDE 10 MG: 5 SOLUTION ORAL at 13:04

## 2021-03-24 RX ADMIN — FENTANYL CITRATE 50 MCG: 50 INJECTION, SOLUTION INTRAMUSCULAR; INTRAVENOUS at 11:18

## 2021-03-24 RX ADMIN — FENTANYL CITRATE 50 MCG: 50 INJECTION, SOLUTION INTRAMUSCULAR; INTRAVENOUS at 13:01

## 2021-03-24 RX ADMIN — ACETAMINOPHEN 1000 MG: 500 TABLET, FILM COATED ORAL at 08:44

## 2021-03-24 RX ADMIN — SODIUM CHLORIDE, POTASSIUM CHLORIDE, SODIUM LACTATE AND CALCIUM CHLORIDE: 600; 310; 30; 20 INJECTION, SOLUTION INTRAVENOUS at 08:44

## 2021-03-24 RX ADMIN — HYDROMORPHONE HYDROCHLORIDE 0.4 MG: 1 INJECTION, SOLUTION INTRAMUSCULAR; INTRAVENOUS; SUBCUTANEOUS at 13:04

## 2021-03-24 RX ADMIN — ONDANSETRON 4 MG: 2 INJECTION INTRAMUSCULAR; INTRAVENOUS at 11:05

## 2021-03-24 RX ADMIN — ROCURONIUM BROMIDE 20 MG: 10 INJECTION, SOLUTION INTRAVENOUS at 11:58

## 2021-03-24 RX ADMIN — PROPOFOL 200 MG: 10 INJECTION, EMULSION INTRAVENOUS at 11:00

## 2021-03-24 RX ADMIN — HYDROMORPHONE HYDROCHLORIDE 0.4 MG: 1 INJECTION, SOLUTION INTRAMUSCULAR; INTRAVENOUS; SUBCUTANEOUS at 16:19

## 2021-03-24 RX ADMIN — ONDANSETRON 4 MG: 2 INJECTION INTRAMUSCULAR; INTRAVENOUS at 15:31

## 2021-03-24 RX ADMIN — POVIDONE IODINE 15 ML: 100 SOLUTION TOPICAL at 08:14

## 2021-03-24 RX ADMIN — FENTANYL CITRATE 50 MCG: 50 INJECTION, SOLUTION INTRAMUSCULAR; INTRAVENOUS at 10:58

## 2021-03-24 RX ADMIN — FENTANYL CITRATE 50 MCG: 50 INJECTION, SOLUTION INTRAMUSCULAR; INTRAVENOUS at 12:53

## 2021-03-24 RX ADMIN — MIDAZOLAM HYDROCHLORIDE 2 MG: 1 INJECTION, SOLUTION INTRAMUSCULAR; INTRAVENOUS at 10:55

## 2021-03-24 RX ADMIN — DEXAMETHASONE SODIUM PHOSPHATE 4 MG: 4 INJECTION, SOLUTION INTRA-ARTICULAR; INTRALESIONAL; INTRAMUSCULAR; INTRAVENOUS; SOFT TISSUE at 11:05

## 2021-03-24 RX ADMIN — HYDROMORPHONE HYDROCHLORIDE 0.4 MG: 1 INJECTION, SOLUTION INTRAMUSCULAR; INTRAVENOUS; SUBCUTANEOUS at 13:28

## 2021-03-24 RX ADMIN — FENTANYL CITRATE 50 MCG: 50 INJECTION, SOLUTION INTRAMUSCULAR; INTRAVENOUS at 14:02

## 2021-03-24 RX ADMIN — CEFAZOLIN 2 G: 330 INJECTION, POWDER, FOR SOLUTION INTRAMUSCULAR; INTRAVENOUS at 11:05

## 2021-03-24 RX ADMIN — FENTANYL CITRATE 50 MCG: 50 INJECTION, SOLUTION INTRAMUSCULAR; INTRAVENOUS at 13:50

## 2021-03-24 RX ADMIN — KETOROLAC TROMETHAMINE 30 MG: 30 INJECTION, SOLUTION INTRAMUSCULAR at 12:35

## 2021-03-24 RX ADMIN — SUGAMMADEX 200 MG: 100 INJECTION, SOLUTION INTRAVENOUS at 12:35

## 2021-03-24 RX ADMIN — ROCURONIUM BROMIDE 50 MG: 10 INJECTION, SOLUTION INTRAVENOUS at 11:00

## 2021-03-24 RX ADMIN — HYDROMORPHONE HYDROCHLORIDE 0.2 MG: 1 INJECTION, SOLUTION INTRAMUSCULAR; INTRAVENOUS; SUBCUTANEOUS at 13:38

## 2021-03-24 ASSESSMENT — PAIN DESCRIPTION - PAIN TYPE
TYPE: SURGICAL PAIN

## 2021-03-24 ASSESSMENT — PAIN SCALES - GENERAL: PAIN_LEVEL: 5

## 2021-03-24 ASSESSMENT — FIBROSIS 4 INDEX: FIB4 SCORE: 1.16

## 2021-03-24 NOTE — ANESTHESIA POSTPROCEDURE EVALUATION
Patient: Marilin Lopez    Procedure Summary     Date: 03/24/21 Room / Location: [facility] ROOM 23 / [facility]    Anesthesia Start: 1054 Anesthesia Stop: 1250    Procedures:       HYSTERECTOMY, LAPAROSCOPIC - TOTAL (N/A Abdomen)      SALPINGECTOMY (Bilateral Abdomen)      CYSTOSCOPY (N/A Bladder) Diagnosis: (ABNORMAL UTERINE BLEEDING)    Surgeons: Mal Nina M.D. Responsible Provider: Yogesh Rothman M.D.    Anesthesia Type: general ASA Status: 2          Final Anesthesia Type: general  Last vitals  BP   Blood Pressure: 121/65    Temp   36.2 °C (97.1 °F)    Pulse   69   Resp   16    SpO2   99 %      Anesthesia Post Evaluation    Patient location during evaluation: PACU  Patient participation: complete - patient participated  Level of consciousness: awake and alert  Pain score: 5    Airway patency: patent  Anesthetic complications: no  Cardiovascular status: hemodynamically stable  Respiratory status: acceptable  Hydration status: euvolemic    PONV: none          No complications documented.     Nurse Pain Score: 7 (NPRS)

## 2021-03-24 NOTE — ANESTHESIA TIME REPORT
Anesthesia Start and Stop Event Times     Date Time Event    3/24/2021 1046 Ready for Procedure     1054 Anesthesia Start     1250 Anesthesia Stop        Responsible Staff  03/24/21    Name Role Begin End    Yogesh Rothman M.D. Anesth 1054 1250        Preop Diagnosis (Free Text):  Pre-op Diagnosis     ABNORMAL UTERINE BLEEDING        Preop Diagnosis (Codes):    Post op Diagnosis  Abnormal uterine bleeding      Premium Reason  Non-Premium    Comments:

## 2021-03-24 NOTE — ANESTHESIA PREPROCEDURE EVALUATION
asthma    Relevant Problems   ANESTHESIA   (+) CECILIO (obstructive sleep apnea)       Physical Exam    Airway   Mallampati: II  TM distance: >3 FB  Neck ROM: full       Cardiovascular - normal exam  Rhythm: regular  Rate: normal     Dental - normal exam           Pulmonary    Abdominal - normal exam     Neurological - normal exam                 Anesthesia Plan    ASA 2       Plan - general       Airway plan will be ETT          Induction: intravenous      Pertinent diagnostic labs and testing reviewed    Informed Consent:    Anesthetic plan and risks discussed with patient.

## 2021-03-24 NOTE — DISCHARGE INSTRUCTIONS
"  ACTIVITY: Rest and take it easy for the first 24 hours.  A responsible adult is recommended to remain with you during that time.  It is normal to feel sleepy.  We encourage you to not do anything that requires balance, judgment or coordination.    MILD FLU-LIKE SYMPTOMS ARE NORMAL. YOU MAY EXPERIENCE GENERALIZED MUSCLE ACHES, THROAT IRRITATION, HEADACHE AND/OR SOME NAUSEA.    FOR 24 HOURS DO NOT:  Drive, operate machinery or run household appliances.  Drink beer or alcoholic beverages.   Make important decisions or sign legal documents.    SPECIAL INSTRUCTIONS: see \"Hysterectomy Discharge Instructions for Post-Op Patients\" handout provided.    DIET: To avoid nausea, slowly advance diet as tolerated, avoiding spicy or greasy foods for the first day.  Add more substantial food to your diet according to your physician's instructions.  Babies can be fed formula or breast milk as soon as they are hungry.  INCREASE FLUIDS AND FIBER TO AVOID CONSTIPATION.    SURGICAL DRESSING/BATHING: You may shower the day following surgery. Pat incisions dry, do not rub. No baths or hot tubs until approved by your physician.     FOLLOW-UP APPOINTMENT:  A follow-up appointment should be arranged with your doctor in 10-14 days; call to schedule.    You should CALL YOUR PHYSICIAN if you develop:  Fever greater than 101 degrees F.  Pain not relieved by medication, or persistent nausea or vomiting.  Excessive bleeding (blood soaking through dressing) or unexpected drainage from the wound.  Extreme redness or swelling around the incision site, drainage of pus or foul smelling drainage.  Inability to urinate or empty your bladder within 8 hours.  Problems with breathing or chest pain.    You should call 911 if you develop problems with breathing or chest pain.  If you are unable to contact your doctor or surgical center, you should go to the nearest emergency room or urgent care center.  Physician's telephone #: Dr. Nina at " [phone]    If any questions arise, call your doctor.  If your doctor is not available, please feel free to call the Surgical Center at [phone]. The Contact Center is open Monday through Friday 7AM to 5PM and may speak to a nurse at [phone], or toll free at [phone].     A registered nurse may call you a few days after your surgery to see how you are doing after your procedure.    MEDICATIONS: Resume taking daily medication.  Take prescribed pain medication with food.  If no medication is prescribed, you may take non-aspirin pain medication if needed.  PAIN MEDICATION CAN BE VERY CONSTIPATING.  Take a stool softener or laxative such as senokot, pericolace, or milk of magnesia if needed.    Last pain medication given at 1:00pm     If your physician has prescribed pain medication that includes Acetaminophen (Tylenol), do not take additional Acetaminophen (Tylenol) while taking the prescribed medication.    Depression / Suicide Risk    As you are discharged from this [facility] facility, it is important to learn how to keep safe from harming yourself.    Recognize the warning signs:  · Abrupt changes in personality, positive or negative- including increase in energy   · Giving away possessions  · Change in eating patterns- significant weight changes-  positive or negative  · Change in sleeping patterns- unable to sleep or sleeping all the time   · Unwillingness or inability to communicate  · Depression  · Unusual sadness, discouragement and loneliness  · Talk of wanting to die  · Neglect of personal appearance   · Rebelliousness- reckless behavior  · Withdrawal from people/activities they love  · Confusion- inability to concentrate     If you or a loved one observes any of these behaviors or has concerns about self-harm, here's what you can do:  · Talk about it- your feelings and reasons for harming yourself  · Remove any means that you might use to hurt yourself (examples: pills, rope,  extension cords, firearm)  · Get professional help from the community (Mental Health, Substance Abuse, psychological counseling)  · Do not be alone:Call your Safe Contact- someone whom you trust who will be there for you.  · Call your local CRISIS HOTLINE 395-7563 or 423-326-3616  · Call your local Children's Mobile Crisis Response Team [place] (813) 234-2325 or www.First Wave  · Call the toll free National Suicide Prevention Hotlines   · National Suicide Prevention Lifeline 204-844-EJBN (0852)  · National Hope Line Network 800-SUICIDE (637-6247)

## 2021-03-24 NOTE — OR SURGEON
Immediate Post OP Note    PreOp Diagnosis:  AUB. fibroid uterus.  PostOp Diagnosis: same.    Procedure(s):  HYSTERECTOMY, LAPAROSCOPIC - TOTAL - Wound Class: Clean  SALPINGECTOMY - Wound Class: Clean  CYSTOSCOPY - Wound Class: Clean Contaminated    Surgeon(s):  LAUREEN Kirkland M.D.    Anesthesiologist/Type of Anesthesia:  Anesthesiologist: Yogesh Rothman M.D./General    Surgical Staff:  Circulator: Jada Mandujano R.N.; Rebekah Corbin R.N.  Relief Circulator: Rebekah Corbin R.N.  Relief Scrub: Armand Serrano  Scrub Person: Gemini Talbot    Specimens removed if any:  ID Type Source Tests Collected by Time Destination   A : UTERS, CERVIX, AND BILATERAL FALLOPIAN TUBES, AND IUD  Tissue Uterus PATHOLOGY SPECIMEN Mal Nina M.D. 3/24/2021 1123        Estimated Blood Loss: minimal.    Findings: enlarged Uterus , both tubes and ovaries normal.     Complications: none.        3/24/2021 1:01 PM Mal Nina M.D.

## 2021-03-24 NOTE — ANESTHESIA PROCEDURE NOTES
Airway    Date/Time: 3/24/2021 11:01 AM  Performed by: Yogesh Rothman M.D.  Authorized by: Yogesh Rothman M.D.     Location:  OR  Urgency:  Elective  Indications for Airway Management:  Anesthesia      Spontaneous Ventilation: absent    Sedation Level:  Deep  Preoxygenated: Yes    Patient Position:  Sniffing  Mask Difficulty Assessment:  1 - vent by mask  Final Airway Type:  Endotracheal airway  Final Endotracheal Airway:  ETT  Cuffed: Yes    Technique Used for Successful ETT Placement:  Direct laryngoscopy    Insertion Site:  Oral  Blade Type:  Ariana  Laryngoscope Blade/Videolaryngoscope Blade Size:  3  ETT Size (mm):  7.0  Measured from:  Teeth  ETT to Teeth (cm):  22  Placement Verified by: auscultation and capnometry    Cormack-Lehane Classification:  Grade IIa - partial view of glottis  Number of Attempts at Approach:  1

## 2021-03-24 NOTE — OR NURSING
1400 discharge paperwork given to significant other. She verbalized understanding and had no questions or concerns.   1530 pt getting dressed and began to feel nauseated, aroma tab given, along with medication.   1545 pt up walking to the bathroom with slight pain. Denies nausea at this time.  1552 pt was able to void, pt states moderate amount of blood. Pt having some pain but unsure if she would like more pain medications at this time, slightly nauseated again. Drinking gingerale.   1610 called anesthesia for more pain medication   1700 pt wheeled out in w/c

## 2021-03-28 ENCOUNTER — NURSE TRIAGE (OUTPATIENT)
Dept: HEALTH INFORMATION MANAGEMENT | Facility: OTHER | Age: 41
End: 2021-03-28

## 2021-03-28 NOTE — TELEPHONE ENCOUNTER
Dr. Nina will call rx. For anti-emetic into pharmacy today    Reason for Disposition  • Taking prescription medication that could cause nausea (e.g., narcotics/opiates, antibiotics, OCPs, many others)    Additional Information  • Negative: Shock suspected (e.g., cold/pale/clammy skin, too weak to stand, low BP, rapid pulse)  • Negative: Sounds like a life-threatening emergency to the triager  • Negative: Insulin-dependent diabetes (Type I) and glucose > 400 mg/dL (22 mmol/L)  • Negative: Drinking very little and dehydration suspected (e.g., no urine > 12 hours, very dry mouth, very lightheaded)  • Negative: Patient sounds very sick or weak to the triager  • Negative: Unable to walk, or can only walk with assistance (e.g., requires support)  • Negative: Difficulty breathing  • Negative: Nausea or vomiting and pregnancy < 20 weeks  • Negative: Menstrual Period - Missed or Late (i.e., pregnancy suspected)  • Negative: Heat exhaustion suspected (i.e., dehydration from heat exposure)  • Negative: Anxiety or stress suspected (i.e., nausea with anxiety attacks or stressful situations)  • Negative: Traumatic Brain Injury (TBI) suspected  • Negative: Vomiting occurs  • Negative: Other symptom is present, see that guideline.  (e.g., chest pain, headache, dizziness, abdominal pain, colds, sore throat, etc.).  • Negative: Fever > 104 F (40 C)  • Negative: Fever > 101 F (38.3 C) and age > 60  • Negative: Fever > 100.0 F (37.8 C) and bedridden (e.g., nursing home patient, CVA, chronic illness, recovering from surgery)  • Negative: Fever > 100.0 F (37.8 C) and diabetes mellitus or weak immune system (e.g., HIV positive, cancer chemo, splenectomy, chronic steroids)  • Negative: Taking any of the following medications: digoxin (Lanoxin), lithium, theophylline, phenytoin (Dilantin)  • Negative: Yellowish color of the skin or white of the eye (i.e., jaundice)  • Negative: Fever present > 3 days (72 hours)  • Negative: Patient  "wants to be seen    Answer Assessment - Initial Assessment Questions  1. NAUSEA SEVERITY: \"How bad is the nausea?\" (e.g., mild, moderate, severe; dehydration, weight loss)    - MILD: loss of appetite without change in eating habits    - MODERATE: decreased oral intake without significant weight loss, dehydration, or malnutrition    - SEVERE: inadequate caloric or fluid intake, significant weight loss, symptoms of dehydration      moderate  2. ONSET: \"When did the nausea begin?\"      This morning  3. VOMITING: \"Any vomiting?\" If so, ask: \"How many times today?\"      no  4. RECURRENT SYMPTOM: \"Have you had nausea before?\" If so, ask: \"When was the last time?\" \"What happened that time?\"      no  5. CAUSE: \"What do you think is causing the nausea?\"      Post op   6. PREGNANCY: \"Is there any chance you are pregnant?\" (e.g., unprotected intercourse, missed birth control pill, broken condom)      N/a    Protocols used: NAUSEA-A-OH      "

## 2021-04-07 ENCOUNTER — HOSPITAL ENCOUNTER (OUTPATIENT)
Dept: RADIOLOGY | Facility: MEDICAL CENTER | Age: 41
End: 2021-04-07
Attending: INTERNAL MEDICINE
Payer: COMMERCIAL

## 2021-04-07 DIAGNOSIS — Z12.39 SCREENING FOR MALIGNANT NEOPLASM OF BREAST: ICD-10-CM

## 2021-04-19 ENCOUNTER — APPOINTMENT (OUTPATIENT)
Dept: RADIOLOGY | Facility: MEDICAL CENTER | Age: 41
End: 2021-04-19
Attending: INTERNAL MEDICINE
Payer: COMMERCIAL

## 2024-11-04 NOTE — OP REPORT
DATE OF SERVICE:  2021     The patient is a 40-year-old  1, para 1 with prior  with   abnormal uterine bleeding.  Endometrial biopsy was benign, here for total   laparoscopic hysterectomy with bilateral salpingectomy.     PREOPERATIVE DIAGNOSES:  Abnormal uterine bleeding and fibroid uterus.     POSTOPERATIVE DIAGNOSES:  Abnormal uterine bleeding and fibroid uterus.     PROCEDURES PERFORMED:  Total laparoscopic hysterectomy with bilateral   salpingectomy and cystoscopy.     ANESTHESIA:  General endotracheal tube.     ANESTHESIOLOGIST:  Yogesh Rothman MD     SURGEON:  Mal Nina MD     ASSISTANT:  Betzaida Latham MD     SPECIMENS:  Uterus with cervix with both tubes.  Also the IUD was inside the   uterus was sent for pathology.     INDICATION:  As discussed above.     FINDINGS:  During laparoscopy, the uterus was noted to be upper limits of   normal size with normal-appearing both fallopian tubes and ovaries and   assessment of the upper abdomen, liver, gallbladder, spleen, omentum and   peritoneal surfaces of the bowel were all unremarkable in appearance.     DESCRIPTION OF PROCEDURE:  The patient was taken to the operating room where   general anesthesia was induced without difficulty.  The patient was then   placed in the dorsal lithotomy position, prepped and draped in a sterile   fashion.  Bladder was Lundberg catheterized.  [place] speculum was then placed in   the patient's vagina.  Anterior lip of the cervix was grasped with single   tooth tenaculum.  Cervix was tagged at 6 o'clock, 12 o'clock positions with 0   Vicryl sutures.  Cervix was dilated with Paulo dilators and a VCare apparatus   was passed into the uterus for uterine manipulation during the planned   operative procedure.  Suture was fixed on the colpotomy ring on VCare and   ligated.  Speculum was removed from the vagina.  Attention was then turned to   the patient's abdomen after re-gloving, infraumbilical skin  infiltrated with   Marcaine with epinephrine.  A 10 mm skin incision was made in the umbilical   fold.  Veress needle was carefully introduced into the abdominal cavity at a   45-degree angle while tenting up the abdominal wall.  Intraperitoneal   placement was confirmed by low pressure peritoneal cavity.  Pneumoperitoneum   was obtained with 4 liters of CO2 gas and a 10 mm trocar and sleeve were then   advanced without difficulty into the abdomen.  Intra-abdominal placement was   confirmed by the laparoscope.  No injury or bleeding under the port site or   entry track noted.  Patient was changed to Trendelenburg position.  Remaining   ports were placed under direct laparoscopic guidance.  A 5 mm right and left   lower quadrant ports were placed.  Attention was directed towards the   laparoscopic hysterectomy.  Direction and location of both ureters were   identified from the pelvic brim to the cardinal ligaments and noted to be   clear from the operative field.  The right fallopian tube was grasped at the   infundibular and the mesosalpinx was exposed.  The mesosalpinx was clamped,   desiccated and transected using the ligation.  The dissection, clamping,   desiccation and transection was carried further along the mesosalpinx parallel   to the fallopian tubes reaching the round ligament about 3 cm from the   uterine cornu.  The tube was completely excised and sent for pathology.  The   utero-ovarian ligament was clamped, coagulated and cut.  The round ligament   was clamped, coagulated and cut.  Anterior leaf of the broad ligament was   lifted up and dissection towards the vesicouterine fold was carried out.  This   was held up and the bladder was reflected down in small segments.  Bands were   excised and pushed down, breaking down the previous adhesions.  Uterine   vessels were skeletonized and uterine artery was clamped, coagulated and cut.    Further mobilization of the bladder, the uterosacral cardinal  ligament   complex was identified, was clamped, coagulated and cut in small segments   making sure the bladder was well away from the reflection.  The same procedure   was repeated on the opposite side.  Then, anterior culdotomy was made,   extended laterally using the J-hook and extended laterally in both directions   and joined posteriorly and the uterus and cervix were  from the   vaginal cuff.  Uterus was delivered through the vagina and left there to   maintain pneumoperitoneum.  Pelvic cavity was copiously irrigated and fluid   was suctioned out.  A V-Loc suture was applied on the right side and the cuff   was closed anterior posteriorly in a running fashion.  Hemostasis was   affirmed.  Pelvic cavity was copiously irrigated and fluid was suctioned out.    All the instruments were removed.  Pneumoperitoneum was released.  Umbilical   port fascia was closed with 0 Vicryl.  All the skin of ports were closed with   4-0 Monocryl.  Attention was directed towards the cystoscopy.  The bladder   Lundberg was deflated and removed and cystoscope was introduced and bladder was   distended.  Bilateral ureteric jet flow was visualized.  All the instruments   were removed and Lundberg was repositioned.  Sponge, lap and instrument counts   were correct x2.  The patient was extubated and transferred to the recovery   room under stable condition.        ______________________________  MD KORTNEY [place]    DD:  03/24/2021 13:17  DT:  03/24/2021 13:54    Job#:  [id number]   [Negative] : Heme/Lymph

## (undated) DEVICE — PAD LAP STERILE 18 X 18 - (5/PK 40PK/CA)

## (undated) DEVICE — KIT  I.V. START (100EA/CA)

## (undated) DEVICE — SPONGE GAUZESTER 4 X 4 4PLY - (128PK/CA)

## (undated) DEVICE — CANISTER SUCTION 3000ML MECHANICAL FILTER AUTO SHUTOFF MEDI-VAC NONSTERILE LF DISP  (40EA/CA)

## (undated) DEVICE — NEPTUNE 4 PORT MANIFOLD - (20/PK)

## (undated) DEVICE — ELECTRODE DUAL RETURN W/ CORD - (50/PK)

## (undated) DEVICE — HEAD HOLDER JUNIOR/ADULT

## (undated) DEVICE — MANIFOLD NEPTUNE 1 PORT (20/PK)

## (undated) DEVICE — PACK LAPAROSCOPY - (1/CA)

## (undated) DEVICE — TRAY FOLEY CATHETER STATLOCK 16FR SURESTEP  (10EA/CA)

## (undated) DEVICE — ENDOSTITCH10MM SUTURING DEVIC - (3/CA)

## (undated) DEVICE — PAD SANITARY 11IN MAXI IND WRAPPED  (12EA/PK 24PK/CA)

## (undated) DEVICE — MASK ANESTHESIA ADULT  - (100/CA)

## (undated) DEVICE — PAD OR TABLE DA VINCI 2IN X 20IN X 72IN - (12EA/CA)

## (undated) DEVICE — TUBE CONNECTING SUCTION - CLEAR PLASTIC STERILE 72 IN (50EA/CA)

## (undated) DEVICE — SLEEVE VASO CALF MED - (10PR/CA)

## (undated) DEVICE — TROCAR 5X100 BLADED ADVANCE - FIXATION (6/BX)

## (undated) DEVICE — NEEDLE INSFL 120MM 14GA VRRS - (20/BX)

## (undated) DEVICE — DRESSING NON-ADHERING 8 X 3 - (50/BX)

## (undated) DEVICE — CHLORAPREP 26 ML APPLICATOR - ORANGE TINT(25/CA)

## (undated) DEVICE — PACK MINOR BASIN - (2EA/CA)

## (undated) DEVICE — SODIUM CHL IRRIGATION 0.9% 1000ML (12EA/CA)

## (undated) DEVICE — SUTURE GENERAL

## (undated) DEVICE — SET IRRIGATION CYSTOSCOPY TUBE L80 IN (20EA/CA)

## (undated) DEVICE — PROTECTOR ULNA NERVE - (36PR/CA)

## (undated) DEVICE — SPONGE GAUZE STER 4X4 8-PL - (2/PK 50PK/BX 12BX/CS)

## (undated) DEVICE — LIGASURE 5MM BLUNT TIP LONG - 44CM (6EA/PK)

## (undated) DEVICE — LACTATED RINGERS INJ 1000 ML - (14EA/CA 60CA/PF)

## (undated) DEVICE — SUTURE 4-0 MONOCRYL PLUS PS-2 - 27 INCH (36/BX)

## (undated) DEVICE — GOWN WARMING STANDARD FLEX - (30/CA)

## (undated) DEVICE — CANISTER SUCTION RIGID RED 1500CC (40EA/CA)

## (undated) DEVICE — DRAPE VAGINAL BIB W/ POUCH (10EA/CA)

## (undated) DEVICE — ELECTRODE 5MM LHK LAPSCP STERILE DISP- MEGADYNE  (5/CA)

## (undated) DEVICE — ARMREST CRADLE FOAM - (2PR/PK 12PR/CA)

## (undated) DEVICE — TUBING LAPAROSCOPIC PLUME DEVICE (10EA/CA)

## (undated) DEVICE — ELECTRODE 850 FOAM ADHESIVE - HYDROGEL RADIOTRNSPRNT (50/PK)

## (undated) DEVICE — TUBING CLEARLINK DUO-VENT - C-FLO (48EA/CA)

## (undated) DEVICE — GLOVE BIOGEL SZ 6.5 SURGICAL PF LTX (50PR/BX 4BX/CA)

## (undated) DEVICE — KIT ANESTHESIA W/CIRCUIT & 3/LT BAG W/FILTER (20EA/CA)

## (undated) DEVICE — CATHETER IV 20 GA X 1-1/4 ---SURG.& SDS ONLY--- (50EA/BX)

## (undated) DEVICE — SET EXTENSION WITH 2 PORTS (48EA/CA) ***PART #2C8610 IS A SUBSTITUTE*****

## (undated) DEVICE — DRAPE STRLE REG TOWEL 18X24 - (10/BX 4BX/CA)"

## (undated) DEVICE — WATER IRRIGATION STERILE 1000ML (12EA/CA)

## (undated) DEVICE — SUTURE 0 VICRYL PLUS CT-2 - 27 INCH (36/BX)

## (undated) DEVICE — SET LEADWIRE 5 LEAD BEDSIDE DISPOSABLE ECG (1SET OF 5/EA)

## (undated) DEVICE — SUCTION INSTRUMENT YANKAUER BULBOUS TIP W/O VENT (50EA/CA)

## (undated) DEVICE — TRAY SRGPRP PVP IOD WT PRP - (20/CA)

## (undated) DEVICE — DRAPESURG STERI-DRAPE LONG - (10/BX 4BX/CA)

## (undated) DEVICE — SENSOR SPO2 NEO LNCS ADHESIVE (20/BX) SEE USER NOTES

## (undated) DEVICE — TROCAR Z THREAD 11 X 100 - BLADED (6/BX)

## (undated) DEVICE — GOWN SURGEONS X-LARGE - DISP. (30/CA)

## (undated) DEVICE — SPONGE GAUZESTER. 2X2 4-PL - (2/PK 50PK/BX 30BX/CS)

## (undated) DEVICE — DRESSING TRANSPARENT FILM TEGADERM 2.375 X 2.75"  (100EA/BX)"

## (undated) DEVICE — SET SUCTION/IRRIGATION WITH DISPOSABLE TIP (6/CA )PART #0250-070-520 IS A SUB

## (undated) DEVICE — UTERINE MANIP V-CARE STANDARD DAVINCI (8EA/CA)

## (undated) DEVICE — GLOVE BIOGEL INDICATOR SZ 7SURGICAL PF LTX - (50/BX 4BX/CA)

## (undated) DEVICE — GLOVE SZ 6.5 BIOGEL PI MICRO - PF LF (50PR/BX)

## (undated) DEVICE — SUTURE 0 VICRYL PLUS CT-1 - 36 INCH (36/BX)